# Patient Record
Sex: MALE | Race: WHITE | URBAN - METROPOLITAN AREA
[De-identification: names, ages, dates, MRNs, and addresses within clinical notes are randomized per-mention and may not be internally consistent; named-entity substitution may affect disease eponyms.]

---

## 2017-04-13 ENCOUNTER — EMERGENCY (EMERGENCY)
Facility: HOSPITAL | Age: 42
LOS: 0 days | Discharge: HOME | End: 2017-04-13
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
## 2017-06-27 DIAGNOSIS — Z87.891 PERSONAL HISTORY OF NICOTINE DEPENDENCE: ICD-10-CM

## 2017-06-27 DIAGNOSIS — S51.812A LACERATION WITHOUT FOREIGN BODY OF LEFT FOREARM, INITIAL ENCOUNTER: ICD-10-CM
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
## 2017-06-27 DIAGNOSIS — Z23 ENCOUNTER FOR IMMUNIZATION: ICD-10-CM
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
## 2020-04-26 ENCOUNTER — MESSAGE (OUTPATIENT)
Age: 45
End: 2020-04-26

## 2020-06-19 ENCOUNTER — EMERGENCY (EMERGENCY)
Facility: HOSPITAL | Age: 45
LOS: 0 days | Discharge: HOME | End: 2020-06-20
Attending: EMERGENCY MEDICINE | Admitting: EMERGENCY MEDICINE
Payer: COMMERCIAL

## 2020-06-19 VITALS
HEART RATE: 85 BPM | DIASTOLIC BLOOD PRESSURE: 69 MMHG | TEMPERATURE: 98 F | SYSTOLIC BLOOD PRESSURE: 114 MMHG | OXYGEN SATURATION: 97 % | RESPIRATION RATE: 18 BRPM | WEIGHT: 190.04 LBS

## 2020-06-19 DIAGNOSIS — Z79.899 OTHER LONG TERM (CURRENT) DRUG THERAPY: ICD-10-CM

## 2020-06-19 DIAGNOSIS — R55 SYNCOPE AND COLLAPSE: ICD-10-CM

## 2020-06-19 DIAGNOSIS — R91.1 SOLITARY PULMONARY NODULE: ICD-10-CM

## 2020-06-19 DIAGNOSIS — Z96.642 PRESENCE OF LEFT ARTIFICIAL HIP JOINT: ICD-10-CM

## 2020-06-19 DIAGNOSIS — Z96.649 PRESENCE OF UNSPECIFIED ARTIFICIAL HIP JOINT: Chronic | ICD-10-CM

## 2020-06-19 PROCEDURE — 99285 EMERGENCY DEPT VISIT HI MDM: CPT

## 2020-06-19 NOTE — ED ADULT NURSE NOTE - NSIMPLEMENTINTERV_GEN_ALL_ED
Implemented All Universal Safety Interventions:  [place] to call system. Call bell, personal items and telephone within reach. Instruct patient to call for assistance. Room bathroom lighting operational. Non-slip footwear when patient is off stretcher. Physically safe environment: no spills, clutter or unnecessary equipment. Stretcher in lowest position, wheels locked, appropriate side rails in place.

## 2020-06-19 NOTE — ED ADULT NURSE NOTE - CHIEF COMPLAINT QUOTE
pt is s/p left hip replacement last week, 2 syncopal episodes since, including tonight.  wife states patient was initially unarousable

## 2020-06-20 LAB
ALBUMIN SERPL ELPH-MCNC: 4 G/DL — SIGNIFICANT CHANGE UP (ref 3.5–5.2)
ALP SERPL-CCNC: 79 U/L — SIGNIFICANT CHANGE UP (ref 30–115)
ALT FLD-CCNC: 105 U/L — HIGH (ref 0–41)
ANION GAP SERPL CALC-SCNC: 11 MMOL/L — SIGNIFICANT CHANGE UP (ref 7–14)
AST SERPL-CCNC: 58 U/L — HIGH (ref 0–41)
BILIRUB SERPL-MCNC: 0.7 MG/DL — SIGNIFICANT CHANGE UP (ref 0.2–1.2)
BUN SERPL-MCNC: 19 MG/DL — SIGNIFICANT CHANGE UP (ref 10–20)
CALCIUM SERPL-MCNC: 9.1 MG/DL — SIGNIFICANT CHANGE UP (ref 8.5–10.1)
CHLORIDE SERPL-SCNC: 98 MMOL/L — SIGNIFICANT CHANGE UP (ref 98–110)
CO2 SERPL-SCNC: 27 MMOL/L — SIGNIFICANT CHANGE UP (ref 17–32)
CREAT SERPL-MCNC: 1 MG/DL — SIGNIFICANT CHANGE UP (ref 0.7–1.5)
GLUCOSE SERPL-MCNC: 123 MG/DL — HIGH (ref 70–99)
HCT VFR BLD CALC: 36.1 % — LOW (ref 42–52)
HGB BLD-MCNC: 12.1 G/DL — LOW (ref 14–18)
MCHC RBC-ENTMCNC: 30.5 PG — SIGNIFICANT CHANGE UP (ref 27–31)
MCHC RBC-ENTMCNC: 33.5 G/DL — SIGNIFICANT CHANGE UP (ref 32–37)
MCV RBC AUTO: 90.9 FL — SIGNIFICANT CHANGE UP (ref 80–94)
NRBC # BLD: 0 /100 WBCS — SIGNIFICANT CHANGE UP (ref 0–0)
NT-PROBNP SERPL-SCNC: 7 PG/ML — SIGNIFICANT CHANGE UP (ref 0–300)
PLATELET # BLD AUTO: 352 K/UL — SIGNIFICANT CHANGE UP (ref 130–400)
POTASSIUM SERPL-MCNC: 4.5 MMOL/L — SIGNIFICANT CHANGE UP (ref 3.5–5)
POTASSIUM SERPL-SCNC: 4.5 MMOL/L — SIGNIFICANT CHANGE UP (ref 3.5–5)
PROT SERPL-MCNC: 6.4 G/DL — SIGNIFICANT CHANGE UP (ref 6–8)
RBC # BLD: 3.97 M/UL — LOW (ref 4.7–6.1)
RBC # FLD: 12 % — SIGNIFICANT CHANGE UP (ref 11.5–14.5)
SODIUM SERPL-SCNC: 136 MMOL/L — SIGNIFICANT CHANGE UP (ref 135–146)
TROPONIN T SERPL-MCNC: <0.01 NG/ML — SIGNIFICANT CHANGE UP
WBC # BLD: 10.44 K/UL — SIGNIFICANT CHANGE UP (ref 4.8–10.8)
WBC # FLD AUTO: 10.44 K/UL — SIGNIFICANT CHANGE UP (ref 4.8–10.8)

## 2020-06-20 PROCEDURE — 71275 CT ANGIOGRAPHY CHEST: CPT | Mod: 26

## 2020-06-20 PROCEDURE — 71045 X-RAY EXAM CHEST 1 VIEW: CPT | Mod: 26

## 2020-06-20 RX ORDER — SODIUM CHLORIDE 9 MG/ML
1000 INJECTION INTRAMUSCULAR; INTRAVENOUS; SUBCUTANEOUS ONCE
Refills: 0 | Status: COMPLETED | OUTPATIENT
Start: 2020-06-20 | End: 2020-06-20

## 2020-06-20 RX ADMIN — SODIUM CHLORIDE 1000 MILLILITER(S): 9 INJECTION INTRAMUSCULAR; INTRAVENOUS; SUBCUTANEOUS at 00:11

## 2020-06-20 NOTE — ED PROVIDER NOTE - OBJECTIVE STATEMENT
45 yo male hx of recent left hip replacement 2/2 advanced arthritis BIBA 2/2 syncope. as per patient, he was sitting and playing video game for couple hours. after he got up, he walked to kitchen and started feeling flush/warm and lightheaded. he grabbed on to the sink and he passed out. witness by daughter, no injury to head. wife reported patient passed out for 1-2 minutes. no seizure like activity noted. denies incontinence/tongue bitting/HA/nausea and vomiting after the episode. denies HA/chest pain/abd pain prior to the episode. Denies HA/dizziness/chest pain/sob/abd pain/n/v/d in ed now.   c/o mild left thigh side pain/swelling which improved since the surgery.

## 2020-06-20 NOTE — ED PROVIDER NOTE - CARE PROVIDER_API CALL
BROCK HUMPHREY  Cardiovascular Disease  [address] [place] [id number]  Phone: [phone]  Fax: [phone]  Follow Up Time:     your primary care provider,   Phone: (   )    -  Fax: (   )    -  Follow Up Time:

## 2020-06-20 NOTE — ED PROVIDER NOTE - PROVIDER TOKENS
PROVIDER:[TOKEN:[[id number]]],FREE:[LAST:[your primary care provider],PHONE:[(   )    -],FAX:[(   )    -]]

## 2020-06-20 NOTE — ED PROVIDER NOTE - CLINICAL SUMMARY MEDICAL DECISION MAKING FREE TEXT BOX
44M p/w a syncopal episode prior to arrival. recent left hip replacement. Vitals reviewed to be wnl. pe- left hip surg. clean dry in tact.  scar with mild swelling noted in the left lle from the knee up. distal pulses present. concern for pe- ctpe neg. lab reviewed by me- wnl. likely vasovagal or from pain. dc home with pmd fu with strict return precautions given.

## 2020-06-20 NOTE — ED PROVIDER NOTE - NSFOLLOWUPINSTRUCTIONS_ED_ALL_ED_FT
You were noted to have what is called, a syncopal episode, which is an event when you temporarily lose consciousness. These events happen for a variety of reasons and you were kept in the hospital to evaluate what the cause of your event was. Thankfully, these events in themselves do not leave any long lasting effects on your body, however, they may happen again if the cause of the problem is not found and treated if need be. Many people never find out what caused their event. If you have been advised to follow up with any doctors, or specialists, please be sure to do so. You were noted to have what is called, a syncopal episode, which is an event when you temporarily lose consciousness. These events happen for a variety of reasons and you were kept in the hospital to evaluate what the cause of your event was. Thankfully, these events in themselves do not leave any long lasting effects on your body, however, they may happen again if the cause of the problem is not found and treated if need be. Many people never find out what caused their event. If you have been advised to follow up with any doctors, or specialists, please be sure to do so.    Syncope    Syncope is when you temporarily lose consciousness, also called fainting or passing out. It is caused by a sudden decrease in blood flow to the brain. Even though most causes of syncope are not dangerous, syncope can be a sign of a serious medical problem. Signs that you may be about to faint include feeling dizzy, lightheaded, nauseas, visual changes, cold/clammy skin. Do not drive, use machinery, or play sports until your health care provider says it is okay.    SEEK IMMEDIATE MEDICAL CARE IF YOU HAVE THE FOLLOWING SYMPTOMS: severe headache, pain in your chest/abdomen/back, bleeding from your mouth or rectum, palpitations, shortness of breath, pain with breathing, seizure, confusion, trouble walking.     Incidental Abnormal Radiological Finding    An incidental abnormal radiological finding (IARF) is an unusual mass or tissue change that is found unexpectedly during an imaging test. IARFs are often found in the kidneys or lungs, but they can also be found in the heart, liver, breasts, brain, gallbladder, uterus, or other organs and tissues.    IARFs can cause symptoms or be related to an undiagnosed illness. Most often, however, they do not cause symptoms and are not a cause for concern.     WHAT ARE COMMON TYPES OF IARFs?  There are many types of IARFs. Common types include:    Lesions. A lesion is a change in tissue due to infection, tissue death, or injury.  Cysts. A cyst is a sac that is filled with fluid, crystals, or some other substance.  Tumors. A tumor is a solid formation. Tumors can be cancerous (malignant) or non-cancerous (benign).    Your health care provider may use medical terms to describe the finding, such as "pulmonary nodule," which means a small mass in the lung, or "renal mass," which means a mass in the kidney. Ask your health care provider about any terms that you do not understand.    DO I NEED FURTHER DIAGNOSIS?  Your health care provider may recommend that you have tests to diagnose the cause of the IARF. Testing is recommended based on:    The size and appearance of the IARF.  Whether you have risk factors or medical conditions that increase your risk of problems.  Whether you have symptoms or concerns.    In many cases, testing is not needed if the IARF is a very small mass or tissue change. Small masses or changes are not often likely to become a problem in the future.    WHAT TYPE OF TESTING MAY BE NEEDED?  The following types of tests may be done when an IARF is found:    Blood tests.  Urine tests.  Imaging tests, such as abdominal ultrasound, CT scan, or MRI.  Biopsy.    Tests and physical exams may be done once, or they may be done regularly for a period of time. Tests and exams that are done regularly are performed to show whether the mass or tissue change is growing and becoming a concern.    WHAT ARE COMMON TREATMENTS?  Treatment varies depending on:    The cause of the IARF.  The location, size, and appearance of the IARF.  Your age.  Any underlying conditions or symptoms.    Treatment is not always needed. Your health care provider may recommend monitoring through watchful waiting and regular tests and exams. If treatment is needed, it may include:    Treatments to reduce the size of the abnormality.  Biopsy or surgical removal of the mass or tissue.  Treatment to address any underlying conditions.    HOME CARE INSTRUCTIONS  Keep all follow-up visits as told by your health care provider, and schedule appointments as directed. This is important. It will allow any problems to be detected early, which can be very beneficial to you.  Try to stay calm, and be sure to ask questions. Make sure you understand the recommendations for monitoring and understand whether there is a reason for concern.    ADDITIONAL NOTES AND INSTRUCTIONS    Please follow up with your Primary MD in 24-48 hr.  Seek immediate medical care for any new/worsening signs or symptoms.

## 2020-06-20 NOTE — ED PROVIDER NOTE - PROGRESS NOTE DETAILS
incidental enlarge lymph node and lung nodule noted and result disclosed to patient. aware he will need to have repeat CT and close f/u within next few months

## 2020-06-20 NOTE — ED PROVIDER NOTE - PHYSICAL EXAMINATION
CONSTITUTIONAL: Well-appearing; well-nourished; in no apparent distress.   EYES: PERRL; EOM intact.   CARDIOVASCULAR: Normal S1, S2; no murmurs, rubs, or gallops.   RESPIRATORY: Normal chest excursion with respiration; breath sounds clear and equal bilaterally; no wheezes, rhonchi, or rales.  GI/: Normal bowel sounds; non-distended; non-tender; no palpable organomegaly.   MS: left thigh/lower leg mildly larger the than right. no calf tenderness. no medial thigh tenderness. + mild ttp to the lateral aspect of left thigt along the iliotibial tract.   SKIN: Normal for age and race; warm; dry; good turgor; no apparent lesions or exudate.   NEURO/PSYCH: A & O x 4; grossly unremarkable. no drifting. strength equal to b/l upper and lower extremities. speaking coherently.

## 2020-06-20 NOTE — ED PROVIDER NOTE - ADDITIONAL RISK FACTOR FREE TEXT BOX
44M p/w a sycopal episode prior to arricval. recent left hip replacement. Vitals reviewed to be wnl. pe- left hip surg. clean dry in tact.  scar with mild swelling noted in the left lle from the knee up. distal pulses present. concern for pe- pending ctpe

## 2020-06-20 NOTE — ED PROVIDER NOTE - PATIENT PORTAL LINK FT
You can access the FollowMyHealth Patient Portal offered by [facility] by registering at the following website: [email or web address]. By joining [employer]’s FollowMyHealth portal, you will also be able to view your health information using other applications (apps) compatible with our system.

## 2022-03-15 NOTE — ED ADULT NURSE NOTE - NSHOSCREENINGQ1_ED_ALL_ED
- Patient PW k 5.6 in ED in the setting of JAMES  , given lokelma   - repeat K 5.7 , was given regular insulin and lokelma   - K normal today  - resolved  - continue monitoring No

## 2022-04-29 ENCOUNTER — EMERGENCY (EMERGENCY)
Facility: HOSPITAL | Age: 47
LOS: 0 days | Discharge: HOME | End: 2022-04-29
Attending: EMERGENCY MEDICINE | Admitting: EMERGENCY MEDICINE
Payer: COMMERCIAL

## 2022-04-29 VITALS
OXYGEN SATURATION: 98 % | HEART RATE: 88 BPM | WEIGHT: 184.97 LBS | SYSTOLIC BLOOD PRESSURE: 141 MMHG | TEMPERATURE: 97 F | DIASTOLIC BLOOD PRESSURE: 76 MMHG | RESPIRATION RATE: 18 BRPM

## 2022-04-29 DIAGNOSIS — S50.312A ABRASION OF LEFT ELBOW, INITIAL ENCOUNTER: ICD-10-CM

## 2022-04-29 DIAGNOSIS — Z79.82 LONG TERM (CURRENT) USE OF ASPIRIN: ICD-10-CM

## 2022-04-29 DIAGNOSIS — S80.211A ABRASION, RIGHT KNEE, INITIAL ENCOUNTER: ICD-10-CM

## 2022-04-29 DIAGNOSIS — S80.212A ABRASION, LEFT KNEE, INITIAL ENCOUNTER: ICD-10-CM

## 2022-04-29 DIAGNOSIS — M79.10 MYALGIA, UNSPECIFIED SITE: ICD-10-CM
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
## 2022-04-29 DIAGNOSIS — S09.90XA UNSPECIFIED INJURY OF HEAD, INITIAL ENCOUNTER: ICD-10-CM

## 2022-04-29 DIAGNOSIS — Z96.649 PRESENCE OF UNSPECIFIED ARTIFICIAL HIP JOINT: Chronic | ICD-10-CM

## 2022-04-29 PROCEDURE — 73130 X-RAY EXAM OF HAND: CPT | Mod: 26,50

## 2022-04-29 PROCEDURE — 73070 X-RAY EXAM OF ELBOW: CPT | Mod: 26,LT

## 2022-04-29 PROCEDURE — 99283 EMERGENCY DEPT VISIT LOW MDM: CPT

## 2022-04-29 RX ORDER — CEPHALEXIN 500 MG
1 CAPSULE ORAL
Qty: 28 | Refills: 0
Start: 2022-04-29 | End: 2022-05-05

## 2022-04-29 RX ORDER — IBUPROFEN 200 MG
800 TABLET ORAL ONCE
Refills: 0 | Status: COMPLETED | OUTPATIENT
Start: 2022-04-29 | End: 2022-04-29

## 2022-04-29 RX ORDER — CEPHALEXIN 500 MG
500 CAPSULE ORAL ONCE
Refills: 0 | Status: COMPLETED | OUTPATIENT
Start: 2022-04-29 | End: 2022-04-29

## 2022-04-29 RX ADMIN — Medication 800 MILLIGRAM(S): at 22:51

## 2022-04-29 RX ADMIN — Medication 500 MILLIGRAM(S): at 22:51

## 2022-04-29 NOTE — ED PROVIDER NOTE - MUSCULOSKELETAL, MLM
swelling and tenderness to rigth 3rd finger and  left hand, FROM, Minor abrasions, FROM, NV intact, tenderness left elbow with abrasions, Minor abrasion to both knees  FROM<
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
## 2022-04-29 NOTE — ED PROVIDER NOTE - PATIENT PORTAL LINK FT
You can access the FollowMyHealth Patient Portal offered by [facility] by registering at the following website: [email or web address]. By joining [employer]’s FollowMyHealth portal, you will also be able to view your health information using other applications (apps) compatible with our system.

## 2022-04-29 NOTE — ED PROVIDER NOTE - CROS ED EYES ALL NEG
SUBJECTIVE:   CC: Pallavi Harper is an 46 year old male who presents for preventative health visit.     Patient has been advised of split billing requirements and indicates understanding: Yes  Healthy Habits:     Getting at least 3 servings of Calcium per day:  Yes    Bi-annual eye exam:  Yes    Dental care twice a year:  NO    Sleep apnea or symptoms of sleep apnea:  Sleep apnea    Diet:  Carbohydrate counting, Vegetarian/vegan, Gluten-free/reduced and Breakfast skipped    Frequency of exercise:  2-3 days/week    Duration of exercise:  15-30 minutes    Taking medications regularly:  Yes    Barriers to taking medications:  None    Medication side effects:  None    PHQ-2 Total Score: 2    Additional concerns today:  Yes          Patient overall feels healthy however he sometimes thinks he has something wrong with him he has mild to moderate anxiety mostly due to current situation with pandemic otherwise no active complaints or symptoms.    Today's PHQ-2 Score:   PHQ-2 (  [employer]) 2021   Q1: Little interest or pleasure in doing things 1   Q2: Feeling down, depressed or hopeless 1   PHQ-2 Score 2   Q1: Little interest or pleasure in doing things Several days   Q2: Feeling down, depressed or hopeless Several days   PHQ-2 Score 2       Abuse: Current or Past(Physical, Sexual or Emotional)- No  Do you feel safe in your environment? Yes    Have you ever done Advance Care Planning? (For example, a Health Directive, POLST, or a discussion with a medical provider or your loved ones about your wishes):   Social History     Tobacco Use     Smoking status: Former Smoker     Packs/day: 0.25     Types: Cigarettes     Quit date: 2/3/2009     Years since quittin.5     Smokeless tobacco: Never Used   Substance Use Topics     Alcohol use: No     If you drink alcohol do you typically have >3 drinks per day or >7 drinks per week? No      No flowsheet data found.    Last PSA: No results found for: PSA    Reviewed orders  "with patient. Reviewed health maintenance and updated orders accordingly - Yes  Lab work is in process    Reviewed and updated as needed this visit by clinical staff  Tobacco  Allergies  Meds              Reviewed and updated as needed this visit by Provider                    Review of Systems  CONSTITUTIONAL: NEGATIVE for fever, chills, change in weight  INTEGUMENTARY/SKIN: NEGATIVE for worrisome rashes, moles or lesions  EYES: NEGATIVE for vision changes or irritation  ENT: NEGATIVE for ear, mouth and throat problems  RESP: NEGATIVE for significant cough or SOB  CV: NEGATIVE for chest pain, palpitations or peripheral edema  GI: NEGATIVE for nausea, abdominal pain, heartburn, or change in bowel habits   male: negative for dysuria, hematuria, decreased urinary stream, erectile dysfunction, urethral discharge  MUSCULOSKELETAL: NEGATIVE for significant arthralgias or myalgia  NEURO: NEGATIVE for weakness, dizziness or paresthesias  PSYCHIATRIC: NEGATIVE for changes in mood or affect    OBJECTIVE:   /78   Pulse 85   Temp 97.7  F (36.5  C) (Tympanic)   Ht 1.64 m (5' 4.57\")   Wt 89.4 kg (197 lb)   SpO2 97%   BMI 33.22 kg/m      Physical Exam  GENERAL: healthy, alert and no distress  EYES: Eyes grossly normal to inspection, PERRL and conjunctivae and sclerae normal  HENT: ear canals and TM's normal, nose and mouth without ulcers or lesions  NECK: no adenopathy, no asymmetry, masses, or scars and thyroid normal to palpation  RESP: lungs clear to auscultation - no rales, rhonchi or wheezes  CV: regular rate and rhythm, normal S1 S2, no S3 or S4, no murmur, click or rub, no peripheral edema and peripheral pulses strong  ABDOMEN: soft, nontender, no hepatosplenomegaly, no masses and bowel sounds normal  MS: no gross musculoskeletal defects noted, no edema  SKIN: no suspicious lesions or rashes  NEURO: Normal strength and tone, mentation intact and speech normal  PSYCH: mentation appears normal, affect " "normal/bright    Diagnostic Test Results:  Labs reviewed in Epic    ASSESSMENT/PLAN:       ICD-10-CM    1. Routine general medical examination at a health care facility  Z00.00 HIV Antigen Antibody Combo     Hepatitis C Screen Reflex to HCV RNA Quant and Genotype     Lipid panel reflex to direct LDL Fasting     Comprehensive metabolic panel (BMP + Alb, Alk Phos, ALT, AST, Total. Bili, TP)     PSA, screen   2. Screening for HIV (human immunodeficiency virus)  Z11.4 HIV Antigen Antibody Combo   3. Need for hepatitis C screening test  Z11.59 Hepatitis C Screen Reflex to HCV RNA Quant and Genotype   4. CARDIOVASCULAR SCREENING; LDL GOAL LESS THAN 160  Z13.6 Lipid panel reflex to direct LDL Fasting     Comprehensive metabolic panel (BMP + Alb, Alk Phos, ALT, AST, Total. Bili, TP)   5. Screening for prostate cancer  Z12.5 PSA, screen   6. BOUCHRA (obstructive sleep apnea)  G47.33      Discussed with the patient about healthy lifestyle.  We will get some blood work and follow-up on that.  He is slight concern about diabetes however he has been checking his blood sugar at home and are never above 100.  Discussed with him about doing daily exercise 30 minutes a day that may be helpful losing some weight and eating healthy food.  He is motivated and will follow up accordingly.  Patient has been advised of split billing requirements and indicates understanding: Yes  COUNSELING:   Reviewed preventive health counseling, as reflected in patient instructions       Regular exercise       Healthy diet/nutrition    Estimated body mass index is 33.22 kg/m  as calculated from the following:    Height as of this encounter: 1.64 m (5' 4.57\").    Weight as of this encounter: 89.4 kg (197 lb).         He reports that he quit smoking about 12 years ago. His smoking use included cigarettes. He smoked 0.25 packs per day. He has never used smokeless tobacco.      Counseling Resources:  ATP IV Guidelines  Pooled Cohorts Equation Calculator  FRAX " Risk Assessment  ICSI Preventive Guidelines  Dietary Guidelines for Americans, 2010  [employer]'s MyPlate  ASA Prophylaxis  Lung CA Screening    Kevan Kay MD  [facility] [place]  Answers for HPI/ROS submitted by the patient on 8/24/2021  Blood in stool: No  heartburn: No  peripheral edema: No  mood changes: No  Skin sensation changes: No  pelvic pain: Yes  impotence: Yes  penile discharge: No       negative...

## 2022-04-29 NOTE — ED ADULT NURSE NOTE - NSIMPLEMENTINTERV_GEN_ALL_ED
Implemented All Fall Risk Interventions:  [place] to call system. Call bell, personal items and telephone within reach. Instruct patient to call for assistance. Room bathroom lighting operational. Non-slip footwear when patient is off stretcher. Physically safe environment: no spills, clutter or unnecessary equipment. Stretcher in lowest position, wheels locked, appropriate side rails in place. Provide visual cue, wrist band, yellow gown, etc. Monitor gait and stability. Monitor for mental status changes and reorient to person, place, and time. Review medications for side effects contributing to fall risk. Reinforce activity limits and safety measures with patient and family.
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
## 2022-04-29 NOTE — ED PROVIDER NOTE - ENMT, MLM
Airway patent, Nasal mucosa clear. Mouth with normal mucosa. Throat has no vesicles, no oropharyngeal exudates and uvula is midline., minor abrasion to left mandible, Bite normal,

## 2022-04-30 PROBLEM — Z78.9 OTHER SPECIFIED HEALTH STATUS: Chronic | Status: ACTIVE | Noted: 2020-06-20

## 2023-04-25 ENCOUNTER — INPATIENT (INPATIENT)
Facility: HOSPITAL | Age: 48
LOS: 1 days | Discharge: HOME | DRG: 384 | End: 2023-04-27
Attending: INTERNAL MEDICINE | Admitting: INTERNAL MEDICINE
Payer: OTHER MISCELLANEOUS

## 2023-04-25 VITALS
TEMPERATURE: 98 F | HEIGHT: 70 IN | WEIGHT: 184.97 LBS | HEART RATE: 88 BPM | SYSTOLIC BLOOD PRESSURE: 139 MMHG | OXYGEN SATURATION: 99 % | RESPIRATION RATE: 19 BRPM | DIASTOLIC BLOOD PRESSURE: 87 MMHG

## 2023-04-25 DIAGNOSIS — R29.6 REPEATED FALLS: ICD-10-CM

## 2023-04-25 DIAGNOSIS — Z96.649 PRESENCE OF UNSPECIFIED ARTIFICIAL HIP JOINT: Chronic | ICD-10-CM

## 2023-04-25 PROBLEM — Z00.00 ENCOUNTER FOR PREVENTIVE HEALTH EXAMINATION: Status: ACTIVE | Noted: 2023-04-25

## 2023-04-25 LAB
ANION GAP SERPL CALC-SCNC: 11 MMOL/L — SIGNIFICANT CHANGE UP (ref 7–14)
BUN SERPL-MCNC: 16 MG/DL — SIGNIFICANT CHANGE UP (ref 10–20)
CALCIUM SERPL-MCNC: 9.6 MG/DL — SIGNIFICANT CHANGE UP (ref 8.4–10.5)
CHLORIDE SERPL-SCNC: 104 MMOL/L — SIGNIFICANT CHANGE UP (ref 98–110)
CO2 SERPL-SCNC: 24 MMOL/L — SIGNIFICANT CHANGE UP (ref 17–32)
CREAT SERPL-MCNC: 1.1 MG/DL — SIGNIFICANT CHANGE UP (ref 0.7–1.5)
EGFR: 83 ML/MIN/1.73M2 — SIGNIFICANT CHANGE UP
GLUCOSE SERPL-MCNC: 94 MG/DL — SIGNIFICANT CHANGE UP (ref 70–99)
HCT VFR BLD CALC: 42.5 % — SIGNIFICANT CHANGE UP (ref 42–52)
HGB BLD-MCNC: 14.9 G/DL — SIGNIFICANT CHANGE UP (ref 14–18)
MCHC RBC-ENTMCNC: 30.3 PG — SIGNIFICANT CHANGE UP (ref 27–31)
MCHC RBC-ENTMCNC: 35.1 G/DL — SIGNIFICANT CHANGE UP (ref 32–37)
MCV RBC AUTO: 86.4 FL — SIGNIFICANT CHANGE UP (ref 80–94)
NRBC # BLD: 0 /100 WBCS — SIGNIFICANT CHANGE UP (ref 0–0)
PLATELET # BLD AUTO: 254 K/UL — SIGNIFICANT CHANGE UP (ref 130–400)
PMV BLD: 9.2 FL — SIGNIFICANT CHANGE UP (ref 7.4–10.4)
POTASSIUM SERPL-MCNC: 4.3 MMOL/L — SIGNIFICANT CHANGE UP (ref 3.5–5)
POTASSIUM SERPL-SCNC: 4.3 MMOL/L — SIGNIFICANT CHANGE UP (ref 3.5–5)
RBC # BLD: 4.92 M/UL — SIGNIFICANT CHANGE UP (ref 4.7–6.1)
RBC # FLD: 12 % — SIGNIFICANT CHANGE UP (ref 11.5–14.5)
SODIUM SERPL-SCNC: 139 MMOL/L — SIGNIFICANT CHANGE UP (ref 135–146)
WBC # BLD: 6.92 K/UL — SIGNIFICANT CHANGE UP (ref 4.8–10.8)
WBC # FLD AUTO: 6.92 K/UL — SIGNIFICANT CHANGE UP (ref 4.8–10.8)

## 2023-04-25 PROCEDURE — 86901 BLOOD TYPING SEROLOGIC RH(D): CPT

## 2023-04-25 PROCEDURE — 86850 RBC ANTIBODY SCREEN: CPT

## 2023-04-25 PROCEDURE — 36415 COLL VENOUS BLD VENIPUNCTURE: CPT

## 2023-04-25 PROCEDURE — 85610 PROTHROMBIN TIME: CPT

## 2023-04-25 PROCEDURE — 99285 EMERGENCY DEPT VISIT HI MDM: CPT

## 2023-04-25 PROCEDURE — 99221 1ST HOSP IP/OBS SF/LOW 40: CPT

## 2023-04-25 PROCEDURE — 73552 X-RAY EXAM OF FEMUR 2/>: CPT | Mod: LT

## 2023-04-25 PROCEDURE — 73502 X-RAY EXAM HIP UNI 2-3 VIEWS: CPT | Mod: 26,LT

## 2023-04-25 PROCEDURE — 97162 PT EVAL MOD COMPLEX 30 MIN: CPT | Mod: GP

## 2023-04-25 PROCEDURE — 80048 BASIC METABOLIC PNL TOTAL CA: CPT

## 2023-04-25 PROCEDURE — 99053 MED SERV 10PM-8AM 24 HR FAC: CPT

## 2023-04-25 PROCEDURE — 86900 BLOOD TYPING SEROLOGIC ABO: CPT

## 2023-04-25 PROCEDURE — 70450 CT HEAD/BRAIN W/O DYE: CPT | Mod: 26,MA

## 2023-04-25 PROCEDURE — 97165 OT EVAL LOW COMPLEX 30 MIN: CPT | Mod: GO

## 2023-04-25 PROCEDURE — 72192 CT PELVIS W/O DYE: CPT | Mod: 26,MA

## 2023-04-25 PROCEDURE — 83735 ASSAY OF MAGNESIUM: CPT

## 2023-04-25 PROCEDURE — 80053 COMPREHEN METABOLIC PANEL: CPT

## 2023-04-25 PROCEDURE — 85730 THROMBOPLASTIN TIME PARTIAL: CPT

## 2023-04-25 PROCEDURE — 73070 X-RAY EXAM OF ELBOW: CPT | Mod: 26,50

## 2023-04-25 PROCEDURE — 85025 COMPLETE CBC W/AUTO DIFF WBC: CPT

## 2023-04-25 PROCEDURE — 85027 COMPLETE CBC AUTOMATED: CPT

## 2023-04-25 PROCEDURE — 71045 X-RAY EXAM CHEST 1 VIEW: CPT | Mod: 26

## 2023-04-25 RX ORDER — KETOROLAC TROMETHAMINE 30 MG/ML
15 SYRINGE (ML) INJECTION ONCE
Refills: 0 | Status: DISCONTINUED | OUTPATIENT
Start: 2023-04-25 | End: 2023-04-25

## 2023-04-25 RX ORDER — MORPHINE SULFATE 50 MG/1
4 CAPSULE, EXTENDED RELEASE ORAL ONCE
Refills: 0 | Status: DISCONTINUED | OUTPATIENT
Start: 2023-04-26 | End: 2023-04-26

## 2023-04-25 RX ORDER — MORPHINE SULFATE 50 MG/1
4 CAPSULE, EXTENDED RELEASE ORAL AT BEDTIME
Refills: 0 | Status: DISCONTINUED | OUTPATIENT
Start: 2023-04-25 | End: 2023-04-26

## 2023-04-25 RX ORDER — MORPHINE SULFATE 50 MG/1
4 CAPSULE, EXTENDED RELEASE ORAL ONCE
Refills: 0 | Status: DISCONTINUED | OUTPATIENT
Start: 2023-04-25 | End: 2023-04-25

## 2023-04-25 RX ORDER — ENOXAPARIN SODIUM 100 MG/ML
40 INJECTION SUBCUTANEOUS EVERY 24 HOURS
Refills: 0 | Status: DISCONTINUED | OUTPATIENT
Start: 2023-04-25 | End: 2023-04-27

## 2023-04-25 RX ADMIN — MORPHINE SULFATE 4 MILLIGRAM(S): 50 CAPSULE, EXTENDED RELEASE ORAL at 11:44

## 2023-04-25 RX ADMIN — MORPHINE SULFATE 4 MILLIGRAM(S): 50 CAPSULE, EXTENDED RELEASE ORAL at 22:30

## 2023-04-25 RX ADMIN — Medication 15 MILLIGRAM(S): at 06:51

## 2023-04-25 NOTE — ED PROVIDER NOTE - CARE PLAN
Assessment and plan of treatment:	fall  imaging, supportive care   1 Principal Discharge DX:	Fall  Assessment and plan of treatment:	fall  imaging, supportive care  Secondary Diagnosis:	Left hip pain  Secondary Diagnosis:	Contusion   Principal Discharge DX:	Fall  Assessment and plan of treatment:	fall  imaging, supportive care  Secondary Diagnosis:	Left hip pain  Secondary Diagnosis:	Contusion  Secondary Diagnosis:	Ambulatory dysfunction

## 2023-04-25 NOTE — ED PROVIDER NOTE - CARE PROVIDER_API CALL
Benigno Olvera (MD)  Orthopaedic Surgery  [address] [place] [address]  Phone: [phone]  Fax: [phone]  Follow Up Time: 1-3 Days

## 2023-04-25 NOTE — ED ADULT NURSE NOTE - NS ED NURSE LEVEL OF CONSCIOUSNESS MENTAL STATUS
DISCHARGE SUMMARY  This is a  male born on 2022 at a gestational age of Gestational Age: 36w4d.     Infant remains hospitalized for:   Information:           Birth Length: 1' 7.5\" (0.495 m)   Birth Head Circumference: 31 cm (12.21\")   Discharge Weight - Scale: 6 lb 11 oz (3.033 kg)  Percent Weight Change Since Birth: 0.44%   Delivery Method: Vaginal, Spontaneous  APGAR One: 9  APGAR Five: 9  APGAR Ten: N/A              Feeding Method Used: Breastfeeding    Recent Labs:   Admission on 2021   Component Date Value Ref Range Status    Sample Type 2021 Cord-Arterial   Final    POC pH 20213   Final    POC pCO2 2021  mmHg Final    POC PO2 2021  mmHg Final    POC HCO3 2021  mmol/L Final    POC Base Excess 2021 -3.6  mmol/L Final    POC O2 SAT 2021  % Final    POC CPB 2021 No   Final    POC  ID 2021 [id number]   Final    POC Device ID 2021 [id number]   Final    Sample Type 2021 Cord-Venous   Final    POC pH 20214   Final    POC pCO2 2021  mmHg Final    POC PO2 2021  mmHg Final    POC HCO3 2021  mmol/L Final    POC Base Excess 2021 -4.5  mmol/L Final    POC O2 SAT 2021  % Final    POC CPB 2021 No   Final    POC  ID 2021 [id number]   Final    POC Device ID 2021 [id number]   Final    Amphetamine Screen, Urine 2021 NOT DETECTED  Negative <1000 ng/mL Final    Barbiturate Screen, Ur 2021 NOT DETECTED  Negative < 200 ng/mL Final    Benzodiazepine Screen, Urine 2021 NOT DETECTED  Negative < 200 ng/mL Final    Cannabinoid Scrn, Ur 2021 NOT DETECTED  Negative < 50ng/mL Final    Cocaine Metabolite Screen, Urine 2021 NOT DETECTED  Negative < 300 ng/mL Final    Opiate Scrn, Ur 2021 NOT DETECTED  Negative < 300ng/mL Final    PCP Screen, Urine 2021 NOT DETECTED  Negative < 25 ng/mL Final    Methadone Screen, Urine 2021 NOT DETECTED  Negative <300 ng/mL Final    Oxycodone Urine 2021 NOT DETECTED  Negative <100 ng/mL Final    FENTANYL SCREEN, URINE 2021 NOT DETECTED  Negative <1 ng/mL Final    Drug Screen Comment: 2021 see below   Final    Meter Glucose 2021 63* 70 - 110 mg/dL Final      Immunization History   Administered Date(s) Administered    Hepatitis B Ped/Adol (Engerix-B, Recombivax HB) 2021       Maternal Labs: Information for the patient's mother:  Ortega Navarrete [[id number]]   No results found for: RPR, RUBELLAIGGQT, HEPBSAG, HIV1X2        Information for the patient's mother:  Oretga Navarrete [[id number]]   B POS   No results for input(s): [address]  in the last 72 hours. TcBili: Transcutaneous Bilirubin Test  Time Taken: 0500  Transcutaneous Bilirubin Result: 6.7  Hearing Screen Result: Screening 1 Results: Right Ear Refer,Left Ear Refer      Oximeter: @LASTSAO2(3)@   CCHD: O2 sat of right hand Pulse Ox Saturation of Right Hand: 100 %  CCHD: O2 sat of foot : Pulse Ox Saturation of Foot: 100 %  CCHD screening result: Screening  Result: Pass    DISCHARGE EXAMINATION:   Vital Signs:  BP 69/36   Pulse 120   Temp 98.2 °F (36.8 °C)   Resp 44   Ht 19.5\" (49.5 cm) Comment: Filed from Delivery Summary  Wt 6 lb 11 oz (3.033 kg)   HC 31 cm (12.21\") Comment: Filed from Delivery Summary  BMI 12.37 kg/m²       General Appearance:  Healthy-appearing, vigorous infant, strong cry.   Skin: warm, dry, normal color, no rashes                             Head:  Sutures mobile, fontanelles normal size  Eyes:  Sclerae white, pupils equal and reactive, red reflex normal  bilaterally                                    Ears:  Well-positioned, well-formed pinnae                         Nose:  Clear, normal mucosa  Throat:  Lips, tongue and mucosa are pink, moist and intact; palate intact  Neck:  Supple, symmetrical  Chest:  Lungs clear to auscultation, respirations unlabored   Heart:  Regular rate & rhythm, S1 S2, no murmurs, rubs, or gallops  Abdomen:  Soft, non-tender, no masses; umbilical stump clean and dry  Umbilicus:   3 vessel cord  Pulses:  Strong equal femoral pulses, brisk capillary refill  Hips:  Negative Peterson, Ortolani, gluteal creases equal  :  Normal genitalia; non-circumcised  Extremities:  Well-perfused, warm and dry  Neuro:  Easily aroused; good symmetric tone and strength; positive root and suck; symmetric normal reflexes                                       Assessment:  male infant born at a gestational age of Gestational Age: 36w4d. Gestational Age: appropriate for gestational age  Gestation: full term  M:   Delivery Route: Delivery Method: Vaginal, Spontaneous   Patient Active Problem List   Diagnosis    Normal  (single liveborn)     Principal diagnosis: <principal problem not specified>     Plan: 1. Discharge home in stable condition with parent(s)/ legal guardian  2. Follow up with PCP: No primary care provider on file. in 1-2 days. Call for appointment. 3. Discharge instructions reviewed with family.         Electronically signed by Billie Zendejas MD on 2021 at 11:36 AM Awake

## 2023-04-25 NOTE — ED PROVIDER NOTE - NSFOLLOWUPINSTRUCTIONS_ED_ALL_ED_FT
Contusion    A contusion is a deep bruise. Contusions are the result of a blunt injury to tissues and muscle fibers under the skin. The injury causes bleeding under the skin. The skin overlying the contusion may turn blue, purple, or yellow. Minor injuries will give you a painless contusion, but more severe contusions may stay painful and swollen for a few weeks.     CAUSES  This condition is usually caused by a blow, trauma, or direct force to an area of the body.    SYMPTOMS  Symptoms of this condition include:    Swelling of the injured area.  Pain and tenderness in the injured area.  Discoloration. The area may have redness and then turn blue, purple, or yellow.    DIAGNOSIS  This condition is diagnosed based on a physical exam and medical history. An X-ray, CT scan, or MRI may be needed to determine if there are any associated injuries, such as broken bones (fractures).    TREATMENT  Specific treatment for this condition depends on what area of the body was injured. In general, the best treatment for a contusion is resting, icing, applying pressure to (compression), and elevating the injured area. This is often called the RICE strategy. Over-the-counter anti-inflammatory medicines may also be recommended for pain control.     HOME CARE INSTRUCTIONS  Rest the injured area.   If directed, apply ice to the injured area:  Put ice in a plastic bag.  Place a towel between your skin and the bag.  Leave the ice on for 20 minutes, 2–3 times per day.  If directed, apply light compression to the injured area using an elastic bandage. Make sure the bandage is not wrapped too tightly. Remove and reapply the bandage as directed by your health care provider.  If possible, raise (elevate) the injured area above the level of your heart while you are sitting or lying down.   Take over-the-counter and prescription medicines only as told by your health care provider.    SEEK MEDICAL CARE IF:  Your symptoms do not improve after several days of treatment.  Your symptoms get worse.   You have difficulty moving the injured area.    SEEK IMMEDIATE MEDICAL CARE IF:  You have severe pain.  You have numbness in a hand or foot.   Your hand or foot turns pale or cold.  ADDITIONAL NOTES AND INSTRUCTIONS    Please follow up with your Primary MD in 24-48 hr.  Seek immediate medical care for any new/worsening signs or symptoms.

## 2023-04-25 NOTE — H&P ADULT - ASSESSMENT
48 yo PMHx of left hip replacement, not on home medicaions c/o pain to b/l elbows, left hip and head after fall at work off 2 foot step onto low back, then hit head. no loc. HD stable, afebrile on arrival to ED with negative trauma workup. Patient was originally on track to be discharged home but found unable to ambulate in ED on attempted  walk with crutches, is able to negotiate only a few steps, Lives alone,  has stairs at home, still complains of a lot of pain, will admit for inability to ambulate and unsafe discharge home at present.    #Inability to ambulate secondary to intractable pain, s/p   #mechanical fall  #Hx L- hip replacement  - CT head/ pelvis on admission without acute fx, CXR unremarkable,    b.l  X-ray elbow without trauma  PLAN:  - c/w morphine PO PRN sever pain  - PT, OT in am  - ambulate with assistance only  - consider MRI pelvis in am if pain unimproving    #Diet: Regular  #DVT Prophylaxis: Lovenox SubQ  #Code Satus: full code

## 2023-04-25 NOTE — ED ADULT NURSE NOTE - CAS DISCH TRANSFER METHOD
Patient has concerns about side effects of bustirone. Patient having severe anxiety and illuminating thoughts( hallucinations).    Aislinn Beard on 2/25/2021 at 8:51 AM    Private car

## 2023-04-25 NOTE — ED PROVIDER NOTE - PHYSICAL EXAMINATION
VITAL SIGNS: I have reviewed nursing notes and confirm.  CONSTITUTIONAL: Well-developed; well-nourished; in no acute distress.  SKIN: Skin exam is warm and dry, no acute rash.  HEAD: Normocephalic; atraumatic.  EYES: PERRL, EOM intact; conjunctiva and sclera clear.  ENT: No nasal discharge; airway clear.   NECK: Supple; non tender.  CARD:+ S1, S2   RESP: No wheezes, rales or rhonchi.  ABD: Normal bowel sounds; soft; non-distended; non-tender;  EXT: Normal ROM. No cyanosis or edema. tender b/l elbows and left hip.  LYMPH: No acute adenopathy.  NEURO: Alert. Grossly unremarkable. No focal deficits.  PSYCH: Cooperative, appropriate.

## 2023-04-25 NOTE — ED ADULT NURSE NOTE - NSICDXPASTSURGICALHX_GEN_ALL_CORE_FT
PAST SURGICAL HISTORY:  History of hip replacement left hip 06/2020 [facility]

## 2023-04-25 NOTE — H&P ADULT - ATTENDING COMMENTS
HPI:  48 yo PMHx of left hip replacement, not on home medicaions c/o pain to b/l elbows, left hip and head after fall at work off 2 foot step onto low back, then hit head. no loc. HD stable, afebrile on arrival to ED with negative trauma workup. Patient was originally on track to be discharged home but found unable to ambulate in ED on attempted  walk with crutches, is able to negotiate only a few steps, Lives alone,  has stairs at home, still complains of a lot of pain, will admit for inability to ambulate and unsafe discharge home at present.   (25 Apr 2023 18:10)    REVIEW OF SYSTEMS: see cc/HPI   CONSTITUTIONAL: No weakness, fevers or chills  EYES/ENT: No visual changes;  No vertigo or throat pain   NECK: No pain or stiffness  RESPIRATORY: No cough, wheezing, hemoptysis; No shortness of breath  CARDIOVASCULAR: No chest pain or palpitations  GASTROINTESTINAL: No abdominal or epigastric pain. No nausea, vomiting, or hematemesis; No diarrhea or constipation. No melena or hematochezia.  GENITOURINARY: No dysuria, frequency or hematuria  NEUROLOGICAL: No numbness or weakness  SKIN: No itching, rashes    Physical Exam:  General: WN/WD NAD  Neurology: A&Ox3, nonfocal, follows commands  Eyes: PERRLA/ EOMI  ENT/Neck: Neck supple, trachea midline, No JVD  Respiratory: CTA B/L, No wheezing, rales, rhonchi  CV: Normal rate regular rhythm, S1S2, no murmurs, rubs or gallops  Abdominal: Soft, NT, ND +BS,   Extremities: No edema, + peripheral pulses  Skin: No Rashes, Hematoma, Ecchymosis  Incisions:   Tubes: HPI:  46 yo PMHx of left hip replacement, not on home medicaions c/o pain to b/l elbows, left hip and head after fall at work off 2 foot step onto low back, then hit head. no loc. HD stable, afebrile on arrival to ED with negative trauma workup. Patient was originally on track to be discharged home but found unable to ambulate in ED on attempted  walk with crutches, is able to negotiate only a few steps, Lives alone,  has stairs at home, still complains of a lot of pain, will admit for inability to ambulate and unsafe discharge home at present.   (25 Apr 2023 18:10)    REVIEW OF SYSTEMS: see cc/HPI   CONSTITUTIONAL: No weakness, fevers or chills  EYES/ENT: No visual changes;  No vertigo or throat pain   NECK: No pain or stiffness  RESPIRATORY: No cough, wheezing, hemoptysis; No shortness of breath  CARDIOVASCULAR: No chest pain or palpitations  GASTROINTESTINAL: No abdominal or epigastric pain. No nausea, vomiting, or hematemesis; No diarrhea or constipation. No melena or hematochezia.  GENITOURINARY: No dysuria, frequency or hematuria  NEUROLOGICAL: No numbness or weakness  SKIN: No itching, rashes    Physical Exam:  General: WN/WD NAD  Neurology: A&Ox3, nonfocal, follows commands  Eyes: PERRLA/ EOMI  ENT/Neck: Neck supple, trachea midline, No JVD  Respiratory: CTA B/L, No wheezing, rales, rhonchi  CV: Normal rate regular rhythm, S1S2, no murmurs, rubs or gallops  Abdominal: Soft, NT, ND +BS,   Extremities: No edema, + peripheral pulses  Skin: No Rashes, Hematoma, Ecchymosis  MSK: diff w/ straight leg lifting / more painful on the L side. Tender in the L lower back/hip    A/p  Inability to ambulate 2/2 pain - lower back and LLE   L sided sciatic type pain   Mechanical fall   S/p L THR done for severe OA ("bone on bone")  -MRI of the L-spine, if abnormal N/S eval    -PT/Rehab eval   -PRN pain Rx   -Muscle relaxed and NSAID PRN     DVT prophylaxis     PATIENT SEEN by ATTENDING 4/25/23 ( note revised 4/26). Yes

## 2023-04-25 NOTE — ED PROVIDER NOTE - CONSIDERATION OF ADMISSION OBSERVATION
Patient reports pain in left hip, no traumatic injuries found, admission was discussed with the patient, he prefers to go home, crutches given, advised to follow-up with his orthopedist Consideration of Admission/Observation

## 2023-04-25 NOTE — ED PROVIDER NOTE - PATIENT PORTAL LINK FT
You can access the FollowMyHealth Patient Portal offered by [facility] by registering at the following website: [email or web address]. By joining [employer]’s FollowMyHealth portal, you will also be able to view your health information using other applications (apps) compatible with our system.

## 2023-04-25 NOTE — H&P ADULT - NSHPLABSRESULTS_GEN_ALL_CORE
LABS:                        14.9   6.92  )-----------( 254      ( 25 Apr 2023 11:50 )             42.5     04-25    139  |  104  |  16  ----------------------------<  94  4.3   |  24  |  1.1    Ca    9.6      25 Apr 2023 11:50    RADIOLOGY:  < from: Xray Chest 1 View- PORTABLE-Urgent (Xray Chest 1 View- PORTABLE-Urgent .) (04.25.23 @ 06:03) >      Impression:    No radiographic evidence of acute cardiopulmonary disease.      < from: Xray Elbow AP + Lateral, Bilateral (04.25.23 @ 06:40) >    Impression:    No acute fracture or acute articular abnormality.        < from: Xray Hip 2-3 Views, Left (04.25.23 @ 06:03) >    Impression:    No acute fracture or acute articular abnormality.

## 2023-04-25 NOTE — ED ADULT TRIAGE NOTE - CHIEF COMPLAINT QUOTE
Pt presenting to ED s/p fall off of ladder approximately 3 feet. Pt c/o R sided hip pain and lower back pain. Pt states he hit the back of his head on the floor

## 2023-04-25 NOTE — ED PROVIDER NOTE - OBJECTIVE STATEMENT
48 yo m hx of left hip replacement, c/o pain to b/l elbows, left hip and head after fall at work off 3 foot step, onto low back, then hit head. no loc. no anticoag.

## 2023-04-25 NOTE — H&P ADULT - HISTORY OF PRESENT ILLNESS
46 yo PMHx of left hip replacement, not on home medicaions c/o pain to b/l elbows, left hip and head after fall at work off 2 foot step onto low back, then hit head. no loc. HD stable, afebrile on arrival to ED with negative trauma workup. Patient was originally on track to be discharged home but found unable to ambulate in ED on attempted  walk with crutches, is able to negotiate only a few steps, Lives alone,  has stairs at home, still complains of a lot of pain, will admit for inability to ambulate and unsafe discharge home at present.

## 2023-04-25 NOTE — H&P ADULT - NSHPPHYSICALEXAM_GEN_ALL_CORE
CONSTITUTIONAL: Well-developed; well-nourished; in no acute distress.  SKIN: Skin exam is warm and dry, no acute rash.  HEAD: Normocephalic; atraumatic.  EYES: PERRL, EOM intact; conjunctiva and sclera clear.  ENT: No nasal discharge; airway clear.   NECK: Supple; non tender.  CARD:+ S1, S2   RESP: No wheezes, rales or rhonchi.  ABD: Normal bowel sounds; soft; non-distended; non-tender;  EXT: Normal ROM. No cyanosis or edema. tender b/l elbows and left hip.  LYMPH: No acute adenopathy.  NEURO: Alert. Grossly unremarkable. No focal deficits.  PSYCH: Cooperative, appropriate.

## 2023-04-25 NOTE — ED PROVIDER NOTE - CLINICAL SUMMARY MEDICAL DECISION MAKING FREE TEXT BOX
47-year-old male with left hip pain status post fall off a 3 foot platform while at work.  No LOC.  Patient reported improvement in pain after administration of pain medications in ED.  Imaging was ordered and reviewed, no acute pathology was found.  Results of all test were discussed with the patient, he reported pain with ambulation, hospital admission was offered, however, he prefers to go home, crutches were provided, he was advised to follow-up with his orthopedist this week as needed.  Strict return precautions given. 47-year-old male with left hip pain status post fall off a 3 foot platform while at work.  No LOC.  Patient reported improvement in pain after administration of pain medications in ED.  Imaging was ordered and reviewed, no acute pathology was found.  Results of all test were discussed with the patient, he reported pain with ambulation, hospital admission was offered, Patient initially refused, however unable to ambulate with crutches, lives alone, has stairs.  Will admit for pain management and inability to ambulate.

## 2023-04-25 NOTE — PATIENT PROFILE ADULT - FALL HARM RISK - HARM RISK INTERVENTIONS

## 2023-04-25 NOTE — ED PROVIDER NOTE - PROGRESS NOTE DETAILS
EP: Patient endorsed to me by Dr. Robert to follow-up with CT scan and dispo.  Patient appears well, reports improvement in pain after administration of pain medications in ED.  Plain films appear negative for acute osseous abnormalities, CT scan done, reading is pending.  Patient is ranging all of his extremities, reports pain in the region of the prosthetic hip on the left.  He is awake and alert, no focal neurodeficits. EP: All imaging reviewed, no acute traumatic pathology found.  This was discussed with the patient, he was given crutches to help with ambulation, advised to follow-up with his orthopedist, Dr. Kendrick, as needed.  Dosing and frequency of OTC pain medications and need for ice packs was discussed with the patient.  All his questions concerns answered, he stable for discharge home. Patient to be discharged from ED. Any available test results were discussed with patient and/or family. Verbal instructions given, including instructions to return to ED immediately for any new, worsening, or concerning symptoms. Patient endorsed understanding. Written discharge instructions additionally given, including follow-up plan.  Patient was given opportunity to ask questions. EP: Patient attempted to walk with crutches, is able to negotiate only a few steps, lives alone,  has stairs at home, still complains of a lot of pain, will admit for inability to ambulate and unsafe discharge home at present. ED: Medical admitting resident requested CBC and chemistry, test were ordered, medicine team will follow the results.

## 2023-04-26 ENCOUNTER — TRANSCRIPTION ENCOUNTER (OUTPATIENT)
Age: 48
End: 2023-04-26

## 2023-04-26 LAB
ALBUMIN SERPL ELPH-MCNC: 4.2 G/DL — SIGNIFICANT CHANGE UP (ref 3.5–5.2)
ALP SERPL-CCNC: 51 U/L — SIGNIFICANT CHANGE UP (ref 30–115)
ALT FLD-CCNC: 27 U/L — SIGNIFICANT CHANGE UP (ref 0–41)
ANION GAP SERPL CALC-SCNC: 7 MMOL/L — SIGNIFICANT CHANGE UP (ref 7–14)
AST SERPL-CCNC: 24 U/L — SIGNIFICANT CHANGE UP (ref 0–41)
BASOPHILS # BLD AUTO: 0.04 K/UL — SIGNIFICANT CHANGE UP (ref 0–0.2)
BASOPHILS NFR BLD AUTO: 0.8 % — SIGNIFICANT CHANGE UP (ref 0–1)
BILIRUB SERPL-MCNC: 1 MG/DL — SIGNIFICANT CHANGE UP (ref 0.2–1.2)
BUN SERPL-MCNC: 16 MG/DL — SIGNIFICANT CHANGE UP (ref 10–20)
CALCIUM SERPL-MCNC: 9.1 MG/DL — SIGNIFICANT CHANGE UP (ref 8.4–10.5)
CHLORIDE SERPL-SCNC: 103 MMOL/L — SIGNIFICANT CHANGE UP (ref 98–110)
CO2 SERPL-SCNC: 27 MMOL/L — SIGNIFICANT CHANGE UP (ref 17–32)
CREAT SERPL-MCNC: 1.1 MG/DL — SIGNIFICANT CHANGE UP (ref 0.7–1.5)
EGFR: 83 ML/MIN/1.73M2 — SIGNIFICANT CHANGE UP
EOSINOPHIL # BLD AUTO: 0.25 K/UL — SIGNIFICANT CHANGE UP (ref 0–0.7)
EOSINOPHIL NFR BLD AUTO: 5 % — SIGNIFICANT CHANGE UP (ref 0–8)
GLUCOSE SERPL-MCNC: 94 MG/DL — SIGNIFICANT CHANGE UP (ref 70–99)
HCT VFR BLD CALC: 44.1 % — SIGNIFICANT CHANGE UP (ref 42–52)
HGB BLD-MCNC: 15 G/DL — SIGNIFICANT CHANGE UP (ref 14–18)
IMM GRANULOCYTES NFR BLD AUTO: 0.2 % — SIGNIFICANT CHANGE UP (ref 0.1–0.3)
LYMPHOCYTES # BLD AUTO: 1.65 K/UL — SIGNIFICANT CHANGE UP (ref 1.2–3.4)
LYMPHOCYTES # BLD AUTO: 33 % — SIGNIFICANT CHANGE UP (ref 20.5–51.1)
MAGNESIUM SERPL-MCNC: 2 MG/DL — SIGNIFICANT CHANGE UP (ref 1.8–2.4)
MCHC RBC-ENTMCNC: 29.8 PG — SIGNIFICANT CHANGE UP (ref 27–31)
MCHC RBC-ENTMCNC: 34 G/DL — SIGNIFICANT CHANGE UP (ref 32–37)
MCV RBC AUTO: 87.5 FL — SIGNIFICANT CHANGE UP (ref 80–94)
MONOCYTES # BLD AUTO: 0.48 K/UL — SIGNIFICANT CHANGE UP (ref 0.1–0.6)
MONOCYTES NFR BLD AUTO: 9.6 % — HIGH (ref 1.7–9.3)
NEUTROPHILS # BLD AUTO: 2.57 K/UL — SIGNIFICANT CHANGE UP (ref 1.4–6.5)
NEUTROPHILS NFR BLD AUTO: 51.4 % — SIGNIFICANT CHANGE UP (ref 42.2–75.2)
NRBC # BLD: 0 /100 WBCS — SIGNIFICANT CHANGE UP (ref 0–0)
PLATELET # BLD AUTO: 240 K/UL — SIGNIFICANT CHANGE UP (ref 130–400)
PMV BLD: 9.4 FL — SIGNIFICANT CHANGE UP (ref 7.4–10.4)
POTASSIUM SERPL-MCNC: 4.5 MMOL/L — SIGNIFICANT CHANGE UP (ref 3.5–5)
POTASSIUM SERPL-SCNC: 4.5 MMOL/L — SIGNIFICANT CHANGE UP (ref 3.5–5)
PROT SERPL-MCNC: 6.5 G/DL — SIGNIFICANT CHANGE UP (ref 6–8)
RBC # BLD: 5.04 M/UL — SIGNIFICANT CHANGE UP (ref 4.7–6.1)
RBC # FLD: 11.9 % — SIGNIFICANT CHANGE UP (ref 11.5–14.5)
SODIUM SERPL-SCNC: 137 MMOL/L — SIGNIFICANT CHANGE UP (ref 135–146)
WBC # BLD: 5 K/UL — SIGNIFICANT CHANGE UP (ref 4.8–10.8)
WBC # FLD AUTO: 5 K/UL — SIGNIFICANT CHANGE UP (ref 4.8–10.8)

## 2023-04-26 PROCEDURE — 99232 SBSQ HOSP IP/OBS MODERATE 35: CPT

## 2023-04-26 PROCEDURE — 73552 X-RAY EXAM OF FEMUR 2/>: CPT | Mod: 26,LT

## 2023-04-26 RX ORDER — METHOCARBAMOL 500 MG/1
750 TABLET, FILM COATED ORAL EVERY 8 HOURS
Refills: 0 | Status: DISCONTINUED | OUTPATIENT
Start: 2023-04-26 | End: 2023-04-27

## 2023-04-26 RX ORDER — ACETAMINOPHEN 500 MG
650 TABLET ORAL EVERY 6 HOURS
Refills: 0 | Status: DISCONTINUED | OUTPATIENT
Start: 2023-04-26 | End: 2023-04-27

## 2023-04-26 RX ORDER — IBUPROFEN 200 MG
600 TABLET ORAL EVERY 8 HOURS
Refills: 0 | Status: DISCONTINUED | OUTPATIENT
Start: 2023-04-26 | End: 2023-04-27

## 2023-04-26 RX ADMIN — Medication 600 MILLIGRAM(S): at 21:39

## 2023-04-26 RX ADMIN — METHOCARBAMOL 750 MILLIGRAM(S): 500 TABLET, FILM COATED ORAL at 05:05

## 2023-04-26 RX ADMIN — METHOCARBAMOL 750 MILLIGRAM(S): 500 TABLET, FILM COATED ORAL at 13:30

## 2023-04-26 RX ADMIN — METHOCARBAMOL 750 MILLIGRAM(S): 500 TABLET, FILM COATED ORAL at 21:38

## 2023-04-26 RX ADMIN — Medication 600 MILLIGRAM(S): at 05:04

## 2023-04-26 RX ADMIN — Medication 600 MILLIGRAM(S): at 13:32

## 2023-04-26 RX ADMIN — Medication 600 MILLIGRAM(S): at 22:09

## 2023-04-26 RX ADMIN — ENOXAPARIN SODIUM 40 MILLIGRAM(S): 100 INJECTION SUBCUTANEOUS at 05:04

## 2023-04-26 NOTE — OCCUPATIONAL THERAPY INITIAL EVALUATION ADULT - RANGE OF MOTION EXAMINATION
(0) swallows foods and liquids w/o difficulty BUE/no Active ROM deficits were identified/no Passive ROM deficits were identified

## 2023-04-26 NOTE — CONSULT NOTE ADULT - NS ATTEND AMEND GEN_ALL_CORE FT
Orthopedic Trauma Attending  Patient seen and examined.  PMHx, Psurg Hx, Medications and Allergies reviewed.  X-rays and CT reviewed.  Agree with findings, assessment and plan.  impression: s/p fall from ht less than 6 ft on to lateral left hip.  Radiographs unremarkable.  CT possible GT fx? versus muscle contusion.  MRI will be significantly degraded by retained prosthesis.  Would continue to mobilize WBAT.  Will follow.

## 2023-04-26 NOTE — DISCHARGE NOTE PROVIDER - CARE PROVIDER_API CALL
Jerod Franco)  Orthopaedic Surgery  [address] [place] [address]  Phone: [phone]  Fax: [phone]  Follow Up Time: 1 week    BRITTNEY CONNOLLY  Internal Medicine  [address] [place] [address]  Phone: [phone]  Fax: [phone]  Follow Up Time: 1 week

## 2023-04-26 NOTE — OCCUPATIONAL THERAPY INITIAL EVALUATION ADULT - LIVES WITH, PROFILE
in a private home +8-10 steps to enter with wide bilateral handrails +walk-in-shower +standard toilet/alone

## 2023-04-26 NOTE — DISCHARGE NOTE PROVIDER - NSDCFUADDAPPT_GEN_ALL_CORE_FT
We have provided info for orthopedic surgeon Dr. Sunny Franco. You can also follow up with your outpatient surgeon Dr. Kendrick

## 2023-04-26 NOTE — OCCUPATIONAL THERAPY INITIAL EVALUATION ADULT - PERTINENT HX OF CURRENT PROBLEM, REHAB EVAL
46 yo PMHx of left hip replacement, not on home medications c/o pain to b/l elbows, left hip and head after fall at work off 2 foot step onto low back, then hit head. No loc. HD stable, afebrile on arrival to ED with negative trauma workup. Patient was originally on track to be discharged home but found unable to ambulate in ED on attempted  walk with crutches, is able to negotiate only a few steps. Lives alone, has stairs at home, still complains of a lot of pain, will admit for inability to ambulate and unsafe discharge home at present.     CT head 4/25: No acute intracranial pathology.  CT pelvis 4/25: No evidence for acute fracture or dislocation.  Xray femur 4/26: Post left total hip arthroplasty. Alignment is preserved. No radiographic evidence for hardware complication. No acute fracture or dislocation. Chronic appearing osseous fragment adjacent to superior acetabulum. Quadriceps enthesophyte.

## 2023-04-26 NOTE — DISCHARGE NOTE PROVIDER - NSDCCPCAREPLAN_GEN_ALL_CORE_FT
PRINCIPAL DISCHARGE DIAGNOSIS  Diagnosis: Fall  Assessment and Plan of Treatment: You were admitted for a mechanical fall and management of your hip pain. Imaging was done during your stay which revealed no significant changes. You were seen by the orhtopedic team and they recommend you follow up with your outpatient orthopedic surgeon Dr. Kendrick.   Please continue with your PT/OT rehab at home and if you have worsening pain or inability to ambulate please come to the ED.      SECONDARY DISCHARGE DIAGNOSES  Diagnosis: Left hip pain  Assessment and Plan of Treatment:     Diagnosis: Contusion  Assessment and Plan of Treatment:     Diagnosis: Ambulatory dysfunction  Assessment and Plan of Treatment:

## 2023-04-26 NOTE — CONSULT NOTE ADULT - CONSULT REASON
Dr Harjinder Rodriguez believes pt will be ready for possible d/c today. Message sent to [facility] at [facility] and she needs updated PT note. Whiteboard message to therapy. fall

## 2023-04-26 NOTE — CONSULT NOTE ADULT - SUBJECTIVE AND OBJECTIVE BOX
Orthopaedic Surgery Consult Note    For Surgeon:     HPI:  47yMale  HPI:  48 yo PMHx of left hip replacement by  in June 2020, not on home medicaions c/o pain to left hip  fall at work (patient is an employee of [employer]) off 2 foot steping stool onto low back and left hip and head 2 days ago. no loc. HD stable, afebrile on arrival to ED with negative trauma workup. Xrays negative in ER, patient admitted for inability to ambulate due to isolated left hip pain. Patient states that since the fall he feels as if his pain has gotten better. he describes it as isolated to the lateral hip and denies any pain at rest or laying in bed. pain has improved with hip flexion and extension, he was unable to flex or extend knee yesterday due to thigh pain but that is resolved and he is able to today. he attempted walking yesterday with crutches which was still painful and hard to do. he denies any urinary symptoms, any numbness or tingling in the leg.     Allergies  No Known Allergies  Intolerances      PAST MEDICAL & SURGICAL HISTORY:  No pertinent past medical history  History of hip replacement  left hip 06/2020 [facility]        MEDICATIONS  (STANDING):  enoxaparin Injectable 40 milliGRAM(s) SubCutaneous every 24 hours  morphine  - Injectable 4 milliGRAM(s) IV Push at bedtime    MEDICATIONS  (PRN):  acetaminophen     Tablet .. 650 milliGRAM(s) Oral every 6 hours PRN Mild Pain (1 - 3)  ibuprofen  Tablet. 600 milliGRAM(s) Oral every 8 hours PRN Moderate Pain (4 - 6)  methocarbamol 750 milliGRAM(s) Oral every 8 hours PRN Muscle Spasm  morphine  - Injectable 4 milliGRAM(s) IV Push once PRN Severe Pain (7 - 10)      Vital Signs Last 24 Hrs  T(C): 36.2 (26 Apr 2023 05:00), Max: 36.4 (25 Apr 2023 15:46)  T(F): 97.1 (26 Apr 2023 05:00), Max: 97.5 (25 Apr 2023 15:46)  HR: 58 (26 Apr 2023 05:00) (56 - 70)  BP: 110/66 (26 Apr 2023 05:00) (110/66 - 140/73)  BP(mean): --  RR: --  SpO2: 100% (26 Apr 2023 05:00) (97% - 100%)    Parameters below as of 26 Apr 2023 05:00  Patient On (Oxygen Delivery Method): room air        Physical Exam:  Patient laying in bed in NAD    LLE:  left hip skin intact with healed JUMANA scar, no bruising, mild swelling to lateral thigh, +ttp at greater trochanter  pt is able to straight leg raise against gravity  FROM knee and ankle   no pain with axial compression or log rolling   SILT sp/dp/t/sural/saph  firing quads/hamstrings/ta/ehl/fhl/gs                        15.0   5.00  )-----------( 240      ( 26 Apr 2023 07:58 )             44.1     04-26    137  |  103  |  16  ----------------------------<  94  4.5   |  27  |  1.1    Ca    9.1      26 Apr 2023 07:58  Mg     2.0     04-26    TPro  6.5  /  Alb  4.2  /  TBili  1.0  /  DBili  x   /  AST  24  /  ALT  27  /  AlkPhos  51  04-26      Imaging: < from: CT Pelvis No Cont (04.25.23 @ 06:07) >  BONES AND SOFT TISSUES: Patient is post left hip arthroplasty. No   periprosthetic lucency or fracture is identified. No acutepelvic   fractures or dislocations identified. No soft tissue hematoma.      A/P: 47yMale with left hip pain s/p fall at work   -WBAT LLE  -no acute fractures or dislocations of the hip are seen on imaging   -PT- ambulate with walker and crutches   -NSAIDs for pain and inflammation; d/c morphine   -if patients pain improving and ambulating improving he can be d/c from ortho standpoint; if unable to ambulate MRI inpatient   -f/u with  outpatient- patients primary orthopedic surgeon

## 2023-04-26 NOTE — DISCHARGE NOTE PROVIDER - CARE PROVIDERS DIRECT ADDRESSES
[email or web address]

## 2023-04-26 NOTE — PROGRESS NOTE ADULT - ASSESSMENT
46 yo PMHx of left hip replacement, not on home medicaions c/o pain to b/l elbows, left hip and head after fall at work off 2 foot step onto low back, then hit head. no loc. HD stable, afebrile on arrival to ED with negative trauma workup. Patient was originally on track to be discharged home but found unable to ambulate in ED on attempted  walk with crutches, is able to negotiate only a few steps, Lives alone,  has stairs at home, still complains of a lot of pain, will admit for inability to ambulate and unsafe discharge home at present.    #Inability to ambulate secondary to left hip intractable pain,  #mechanical fall  #Hx L- hip replacement  - CT head/ pelvis on admission without acute findings, CXR unremarkable,    b.l  X-ray elbow without trauma  - c/w morphine PO PRN sever pain  - PT, OT in am  - ambulate with assistance only  - Ortho recs noted. >> WBAT.    #Diet: Regular  #DVT Prophylaxis: Lovenox SubQ  #Code Satus: full code    #Pending : PT/OT, Pain control    Dispo: STR vs Home

## 2023-04-26 NOTE — PROGRESS NOTE ADULT - ASSESSMENT
46 yo PMHx of left hip replacement, not on home medicaions c/o pain to b/l elbows, left hip and head after fall at work off 2 foot step onto low back, then hit head. no loc. HD stable, afebrile on arrival to ED with negative trauma workup. Patient was originally on track to be discharged home but found unable to ambulate in ED on attempted  walk with crutches, is able to negotiate only a few steps, Lives alone,  has stairs at home, still complains of a lot of pain, will admit for inability to ambulate and unsafe discharge home at present.    #Inability to ambulate secondary to intractable pain,  #mechanical fall  #Hx L- hip replacement  - CT head/ pelvis on admission without acute fx, CXR unremarkable,    b.l  X-ray elbow without trauma  PLAN:  - c/w morphine PO PRN sever pain  - PT, OT in am  - ambulate with assistance only  - consider MRI pelvis in am if pain unimproving    #Diet: Regular  #DVT Prophylaxis: Lovenox SubQ  #Code Satus: full code 46 yo PMHx of left hip replacement, not on home medicaions c/o pain to b/l elbows, left hip and head after fall at work off 2 foot step onto low back, then hit head. no loc. HD stable, afebrile on arrival to ED with negative trauma workup. Patient was originally on track to be discharged home but found unable to ambulate in ED on attempted  walk with crutches, is able to negotiate only a few steps, Lives alone,  has stairs at home, still complains of a lot of pain, will admit for inability to ambulate and unsafe discharge home at present.    #Inability to ambulate secondary to left hip intractable pain,  #mechanical fall  #Hx L- hip replacement  - CT head/ pelvis on admission without acute findings, CXR unremarkable,    b.l  X-ray elbow without trauma  - c/w morphine PO PRN sever pain  - PT, OT in am  - ambulate with assistance only  - Ortho contacted; pending recs  - consider MRI pelvis in am if pain unimproving    #Diet: Regular  #DVT Prophylaxis: Lovenox SubQ  #Code Satus: full code  #Dispo: pending Ortho recs and pain improvement

## 2023-04-26 NOTE — DISCHARGE NOTE PROVIDER - PROVIDER TOKENS
PROVIDER:[TOKEN:[[id number]],FOLLOWUP:[1 week]],PROVIDER:[TOKEN:[[id number]],FOLLOWUP:[1 week]]

## 2023-04-26 NOTE — DISCHARGE NOTE PROVIDER - NSDCMRMEDTOKEN_GEN_ALL_CORE_FT
Aspir 81:   CeleBREX:   cephalexin 500 mg oral tablet: 1 tab(s) orally 4 times a day   gabapentin:    ibuprofen 600 mg oral tablet: 1 tab(s) orally every 8 hours as needed for Moderate Pain (4 - 6) take one tablet every 8 hours PRN for pain  methocarbamol 750 mg oral tablet: 1 tab(s) orally every 8 hours as needed for Muscle Spasm Take one tablet every 8 hours as needed for muscle pain/spasms

## 2023-04-26 NOTE — PHYSICAL THERAPY INITIAL EVALUATION ADULT - GENERAL OBSERVATIONS, REHAB EVAL
1:45-2:15 pt encountered in bed in [facility]. He was able to safely ambulate and negotiated 12 steps with supervision.  Pt would benefit from outpatient PT.

## 2023-04-26 NOTE — DISCHARGE NOTE PROVIDER - HOSPITAL COURSE
HPI:  48 yo PMHx of left hip replacement, not on home medicaions c/o pain to b/l elbows, left hip and head after fall at work off 2 foot step onto low back, then hit head. no loc. HD stable, afebrile on arrival to ED with negative trauma workup. Patient was originally on track to be discharged home but found unable to ambulate in ED on attempted  walk with crutches, is able to negotiate only a few steps, Lives alone,  has stairs at home, still complains of a lot of pain, will admit for inability to ambulate and unsafe discharge home at present.   (25 Apr 2023 18:10)    Active Issues during stay      #Inability to ambulate secondary to left hip intractable pain,  #mechanical fall  #Hx L- hip replacement  - CT head/ pelvis on admission without acute findings, CXR unremarkable,    b.l  X-ray elbow without trauma  - c/w morphine PO PRN sever pain  - PT, OT evaluated  - ambulate with assistance only  - Ortho contacted; WBAT and no need for MRI now --> OP follow up  - consider MRI pelvis in am if pain unimproving   HPI:  46 yo PMHx of left hip replacement, not on home medicaions c/o pain to b/l elbows, left hip and head after fall at work off 2 foot step onto low back, then hit head. no loc. HD stable, afebrile on arrival to ED with negative trauma workup. Patient was originally on track to be discharged home but found unable to ambulate in ED on attempted  walk with crutches, is able to negotiate only a few steps, Lives alone,  has stairs at home, still complains of a lot of pain, will admit for inability to ambulate and unsafe discharge home at present.   (25 Apr 2023 18:10)    Active Issues during stay      #Inability to ambulate secondary to left hip intractable pain,  #mechanical fall  #Hx L- hip replacement  - CT head/ pelvis on admission without acute findings, CXR unremarkable,    b.l  X-ray elbow without trauma  - pt does not want opioids - c/w ibuprofen 600mg q8hr PRN, methocarbamol 750mg q8hr PRN   - s/p PT/OT - op PT/OT acceptable   - ortho following - will f/u w/ Dr. Kendrick op   - ambulate with walker/crutches   - can be d/c home with op follow up

## 2023-04-26 NOTE — OCCUPATIONAL THERAPY INITIAL EVALUATION ADULT - GENERAL OBSERVATIONS, REHAB EVAL
13:50-14:13; chart reviewed, ok to treat by Occupational Therapist as confirmed by BENNIE Bloom, Pt received semifowler +heplock in NAD. Pt reported 5/10 left gluteal pain; BENNIE Bloom aware. Pt in agreement with OT IE.

## 2023-04-27 ENCOUNTER — TRANSCRIPTION ENCOUNTER (OUTPATIENT)
Age: 48
End: 2023-04-27

## 2023-04-27 VITALS
HEART RATE: 63 BPM | DIASTOLIC BLOOD PRESSURE: 77 MMHG | RESPIRATION RATE: 18 BRPM | SYSTOLIC BLOOD PRESSURE: 141 MMHG | OXYGEN SATURATION: 97 % | TEMPERATURE: 97 F

## 2023-04-27 LAB
ALBUMIN SERPL ELPH-MCNC: 4.5 G/DL — SIGNIFICANT CHANGE UP (ref 3.5–5.2)
ALP SERPL-CCNC: 52 U/L — SIGNIFICANT CHANGE UP (ref 30–115)
ALT FLD-CCNC: 30 U/L — SIGNIFICANT CHANGE UP (ref 0–41)
ANION GAP SERPL CALC-SCNC: 13 MMOL/L — SIGNIFICANT CHANGE UP (ref 7–14)
APTT BLD: 32.8 SEC — SIGNIFICANT CHANGE UP (ref 27–39.2)
AST SERPL-CCNC: 23 U/L — SIGNIFICANT CHANGE UP (ref 0–41)
BILIRUB SERPL-MCNC: 1.2 MG/DL — SIGNIFICANT CHANGE UP (ref 0.2–1.2)
BLD GP AB SCN SERPL QL: SIGNIFICANT CHANGE UP
BUN SERPL-MCNC: 16 MG/DL — SIGNIFICANT CHANGE UP (ref 10–20)
CALCIUM SERPL-MCNC: 9.5 MG/DL — SIGNIFICANT CHANGE UP (ref 8.4–10.5)
CHLORIDE SERPL-SCNC: 104 MMOL/L — SIGNIFICANT CHANGE UP (ref 98–110)
CO2 SERPL-SCNC: 24 MMOL/L — SIGNIFICANT CHANGE UP (ref 17–32)
CREAT SERPL-MCNC: 1.1 MG/DL — SIGNIFICANT CHANGE UP (ref 0.7–1.5)
EGFR: 83 ML/MIN/1.73M2 — SIGNIFICANT CHANGE UP
GLUCOSE SERPL-MCNC: 81 MG/DL — SIGNIFICANT CHANGE UP (ref 70–99)
HCT VFR BLD CALC: 47 % — SIGNIFICANT CHANGE UP (ref 42–52)
HGB BLD-MCNC: 16.1 G/DL — SIGNIFICANT CHANGE UP (ref 14–18)
INR BLD: 1.05 RATIO — SIGNIFICANT CHANGE UP (ref 0.65–1.3)
MAGNESIUM SERPL-MCNC: 2.1 MG/DL — SIGNIFICANT CHANGE UP (ref 1.8–2.4)
MCHC RBC-ENTMCNC: 29.8 PG — SIGNIFICANT CHANGE UP (ref 27–31)
MCHC RBC-ENTMCNC: 34.3 G/DL — SIGNIFICANT CHANGE UP (ref 32–37)
MCV RBC AUTO: 86.9 FL — SIGNIFICANT CHANGE UP (ref 80–94)
NRBC # BLD: 0 /100 WBCS — SIGNIFICANT CHANGE UP (ref 0–0)
PLATELET # BLD AUTO: 248 K/UL — SIGNIFICANT CHANGE UP (ref 130–400)
PMV BLD: 9.6 FL — SIGNIFICANT CHANGE UP (ref 7.4–10.4)
POTASSIUM SERPL-MCNC: 4.2 MMOL/L — SIGNIFICANT CHANGE UP (ref 3.5–5)
POTASSIUM SERPL-SCNC: 4.2 MMOL/L — SIGNIFICANT CHANGE UP (ref 3.5–5)
PROT SERPL-MCNC: 7.2 G/DL — SIGNIFICANT CHANGE UP (ref 6–8)
PROTHROM AB SERPL-ACNC: 12 SEC — SIGNIFICANT CHANGE UP (ref 9.95–12.87)
RBC # BLD: 5.41 M/UL — SIGNIFICANT CHANGE UP (ref 4.7–6.1)
RBC # FLD: 11.8 % — SIGNIFICANT CHANGE UP (ref 11.5–14.5)
SODIUM SERPL-SCNC: 141 MMOL/L — SIGNIFICANT CHANGE UP (ref 135–146)
WBC # BLD: 4.98 K/UL — SIGNIFICANT CHANGE UP (ref 4.8–10.8)
WBC # FLD AUTO: 4.98 K/UL — SIGNIFICANT CHANGE UP (ref 4.8–10.8)

## 2023-04-27 PROCEDURE — 99232 SBSQ HOSP IP/OBS MODERATE 35: CPT

## 2023-04-27 RX ORDER — METHOCARBAMOL 500 MG/1
1 TABLET, FILM COATED ORAL
Qty: 42 | Refills: 0
Start: 2023-04-27 | End: 2023-05-10

## 2023-04-27 RX ORDER — ASPIRIN/CALCIUM CARB/MAGNESIUM 324 MG
0 TABLET ORAL
Qty: 0 | Refills: 0 | DISCHARGE

## 2023-04-27 RX ORDER — CELECOXIB 200 MG/1
0 CAPSULE ORAL
Qty: 0 | Refills: 0 | DISCHARGE

## 2023-04-27 RX ORDER — GABAPENTIN 400 MG/1
0 CAPSULE ORAL
Qty: 0 | Refills: 0 | DISCHARGE

## 2023-04-27 RX ORDER — IBUPROFEN 200 MG
1 TABLET ORAL
Qty: 42 | Refills: 0
Start: 2023-04-27 | End: 2023-05-10

## 2023-04-27 RX ADMIN — Medication 600 MILLIGRAM(S): at 09:43

## 2023-04-27 RX ADMIN — Medication 600 MILLIGRAM(S): at 10:40

## 2023-04-27 RX ADMIN — METHOCARBAMOL 750 MILLIGRAM(S): 500 TABLET, FILM COATED ORAL at 09:25

## 2023-04-27 NOTE — PROGRESS NOTE ADULT - SUBJECTIVE AND OBJECTIVE BOX
ERNAARTIES, VELMA  47y  Male      Patient is a 47y old  Male who presents with a chief complaint of Mechanical Fall       INTERVAL HPI/OVERNIGHT EVENTS:      ******************************* REVIEW OF SYSTEMS:**********************************************    All other review of systems negative    *********************** VITALS ******************************************    T(F): 97.1 (04-27-23 @ 05:00)  HR: 55 (04-27-23 @ 05:00) (55 - 61)  BP: 123/68 (04-27-23 @ 05:00) (119/71 - 137/73)  RR: 18 (04-27-23 @ 05:00) (18 - 18)  SpO2: 100% (04-27-23 @ 05:00) (98% - 100%)            ******************************** PHYSICAL EXAM:**************************************************  GENERAL: NAD    PSYCH: no agitation, baseline mentation  HEENT:     NERVOUS SYSTEM:  Alert & Oriented X3,  PULMONARY: OLIVIA, CTA    CARDIOVASCULAR: S1S2 RRR    GI: Soft, NT, ND; BS present.    EXTREMITIES:  2+ Peripheral Pulses, No clubbing, cyanosis, or edema    LYMPH: No lymphadenopathy noted    SKIN: No rashes or lesions      **************************** LABS *******************************************************                          16.1   4.98  )-----------( 248      ( 27 Apr 2023 06:08 )             47.0     04-27    141  |  104  |  16  ----------------------------<  81  4.2   |  24  |  1.1    Ca    9.5      27 Apr 2023 06:08  Mg     2.1     04-27    TPro  7.2  /  Alb  4.5  /  TBili  1.2  /  DBili  x   /  AST  23  /  ALT  30  /  AlkPhos  52  04-27        PT/INR - ( 27 Apr 2023 06:08 )   PT: 12.00 sec;   INR: 1.05 ratio         PTT - ( 27 Apr 2023 06:08 )  PTT:32.8 sec  Lactate Trend        CAPILLARY BLOOD GLUCOSE              **************************Active Medications *******************************************  No Known Allergies      acetaminophen     Tablet .. 650 milliGRAM(s) Oral every 6 hours PRN  enoxaparin Injectable 40 milliGRAM(s) SubCutaneous every 24 hours  ibuprofen  Tablet. 600 milliGRAM(s) Oral every 8 hours PRN  methocarbamol 750 milliGRAM(s) Oral every 8 hours PRN      ***************************************************  RADIOLOGY & ADDITIONAL TESTS:    Imaging Personally Reviewed:  [ ] YES  [ ] NO    HEALTH ISSUES - PROBLEM Dx:      
HPI:  48 yo PMHx of left hip replacement, not on home medicaions c/o pain to b/l elbows, left hip and head after fall at work off 2 foot step onto low back, then hit head. no loc. HD stable, afebrile on arrival to ED with negative trauma workup. Patient was originally on track to be discharged home but found unable to ambulate in ED on attempted  walk with crutches, is able to negotiate only a few steps, Lives alone,  has stairs at home, still complains of a lot of pain, will admit for inability to ambulate and unsafe discharge home at present.   (25 Apr 2023 18:10)    Currently admitted to medicine with the primary diagnosis of Fall       Today is hospital day 2d.     INTERVAL HPI / OVERNIGHT EVENTS:  Patient was examined and seen at bedside. This morning he is resting in bed, no events overnight. Worked with PT/OT yesterday, cleared for dc home.     ROS: Otherwise unremarkable     PAST MEDICAL & SURGICAL HISTORY  No pertinent past medical history    History of hip replacement  left hip 06/2020 [facility]      ALLERGIES  No Known Allergies    MEDICATIONS  STANDING MEDICATIONS  enoxaparin Injectable 40 milliGRAM(s) SubCutaneous every 24 hours    PRN MEDICATIONS  acetaminophen     Tablet .. 650 milliGRAM(s) Oral every 6 hours PRN  ibuprofen  Tablet. 600 milliGRAM(s) Oral every 8 hours PRN  methocarbamol 750 milliGRAM(s) Oral every 8 hours PRN    VITALS:  T(F): 97.1  HR: 55  BP: 123/68  RR: 18  SpO2: 100%    PHYSICAL EXAM  * General Appearance: Alert, cooperative, interactive, well-groomed, oriented to time, place, and person, in no acute distress  * Lungs: Respirations unlabored, Good bilateral air entry, normal breath sounds  * Chest Wall: No tenderness or deformity  * Heart: Regular Rate and Rhythm, normal S1 and S2, no audible murmur, rub, or gallop  * Abdomen: Symmetric, soft, non-tender, bowel sounds active all four quadrants  * Extremities: Extremities normal, atraumatic, no cyanosis, no lower extremity pitting edema bilaterally,    LABS                        16.1   4.98  )-----------( 248      ( 27 Apr 2023 06:08 )             47.0     04-27    141  |  104  |  16  ----------------------------<  81  4.2   |  24  |  1.1    Ca    9.5      27 Apr 2023 06:08  Mg     2.1     04-27    TPro  7.2  /  Alb  4.5  /  TBili  1.2  /  DBili  x   /  AST  23  /  ALT  30  /  AlkPhos  52  04-27    PT/INR - ( 27 Apr 2023 06:08 )   PT: 12.00 sec;   INR: 1.05 ratio         PTT - ( 27 Apr 2023 06:08 )  PTT:32.8 sec              
Location: [address] ([facility] 3A)  Patient Name: VELMA MCCLOUD  Age: 47y  Gender: Male    Past Medical and Surgical History:  No pertinent past medical history    History of hip replacement  left hip 06/2020 [facility]        Social History:  Social History:      Allergies:  No Known Allergies      Patient is a 47y old Male who presents with a chief complaint of   Primary diagnosis of Repeated falls        Progress Note  This morning patient was seen and examined at bedside.    Today is hospital day 1d.  Reports having good night sleep. Hip and elbow pain (chief complaint) has improved. Appetite is adequate, and denies nausea or vomiting. Denies any abdominal pain, diarrhea, or constipation.   Report difficulty bearing weight on his left hip and rotating laterally due to pain  Bed bound no shortness of breath, chest pain, palpitations, or light headedness.  Voiding freely    Vital Signs in the last 24 hours   Vitals Summary T(C): 36.2 (04-26-23 @ 05:00), Max: 36.4 (04-25-23 @ 15:46)  HR: 58 (04-26-23 @ 05:00) (56 - 70)  BP: 110/66 (04-26-23 @ 05:00) (110/66 - 140/73)  RR: --  SpO2: 100% (04-26-23 @ 05:00) (97% - 100%)  Vent Data   Intake/ Output       Physical Exam  * General Appearance: Alert, cooperative, interactive, well-groomed, oriented to time, place, and person, in no acute distress  * Lungs: Respirations unlabored, Good bilateral air entry, normal breath sounds  * Chest Wall: No tenderness or deformity  * Heart: Regular Rate and Rhythm, normal S1 and S2, no audible murmur, rub, or gallop  * Abdomen: Symmetric, soft, non-tender, bowel sounds active all four quadrants  * Extremities: Extremities normal, atraumatic, no cyanosis, no lower extremity pitting edema bilaterally,      Investigations   Laboratory Workup  - CBC:                        14.9   6.92  )-----------( 254      ( 25 Apr 2023 11:50 )             42.5     - Chemistry:  04-25    139  |  104  |  16  ----------------------------<  94  4.3   |  24  |  1.1    Ca    9.6      25 Apr 2023 11:50      - Coagulation Studies:    - ABG:    - Cardiac Markers:        Microbiological Workup        Radiological Workup  *  Current Medications  Standing Medications  enoxaparin Injectable 40 milliGRAM(s) SubCutaneous every 24 hours  morphine  - Injectable 4 milliGRAM(s) IV Push at bedtime    PRN Medications  ibuprofen  Tablet. 600 milliGRAM(s) Oral every 8 hours PRN Moderate Pain (4 - 6)  methocarbamol 750 milliGRAM(s) Oral every 8 hours PRN Muscle Spasm  morphine  - Injectable 4 milliGRAM(s) IV Push once PRN Severe Pain (7 - 10)    Singles Doses Administered  (ADM OVERRIDE) 1 each &lt;see task&gt; GiveOnce  (ADM OVERRIDE) 1 each &lt;see task&gt; GiveOnce  (ADM OVERRIDE) 3 each &lt;see task&gt; GiveOnce  (ADM OVERRIDE) 1 each &lt;see task&gt; GiveOnce  ketorolac   Injectable 15 milliGRAM(s) IntraMuscular Once  morphine  - Injectable 4 milliGRAM(s) IV Push once      
  AME VELMA  47y  Male      Patient is a 47y old  Male who presents with a chief complaint of Mechanical Fall      INTERVAL HPI/OVERNIGHT EVENTS:      ******************************* REVIEW OF SYSTEMS:**********************************************    All other review of systems negative    *********************** VITALS ******************************************    T(F): 96.5 (04-26-23 @ 13:00)  HR: 56 (04-26-23 @ 13:00) (56 - 70)  BP: 119/71 (04-26-23 @ 13:00) (110/66 - 140/73)  RR: 18 (04-26-23 @ 13:00) (18 - 18)  SpO2: 98% (04-26-23 @ 13:00) (97% - 100%)            ******************************** PHYSICAL EXAM:**************************************************  GENERAL: NAD    PSYCH: no agitation, baseline mentation  HEENT:     NERVOUS SYSTEM:  Alert & Oriented X3,     PULMONARY: OLIVIA, CTA    CARDIOVASCULAR: S1S2 RRR    GI: Soft, NT, ND; BS present.    EXTREMITIES:  2+ Peripheral Pulses, No clubbing, cyanosis, or edema    LYMPH: No lymphadenopathy noted    SKIN: No rashes or lesions      **************************** LABS *******************************************************                          15.0   5.00  )-----------( 240      ( 26 Apr 2023 07:58 )             44.1     04-26    137  |  103  |  16  ----------------------------<  94  4.5   |  27  |  1.1    Ca    9.1      26 Apr 2023 07:58  Mg     2.0     04-26    TPro  6.5  /  Alb  4.2  /  TBili  1.0  /  DBili  x   /  AST  24  /  ALT  27  /  AlkPhos  51  04-26          Lactate Trend        CAPILLARY BLOOD GLUCOSE              **************************Active Medications *******************************************  No Known Allergies      acetaminophen     Tablet .. 650 milliGRAM(s) Oral every 6 hours PRN  enoxaparin Injectable 40 milliGRAM(s) SubCutaneous every 24 hours  ibuprofen  Tablet. 600 milliGRAM(s) Oral every 8 hours PRN  methocarbamol 750 milliGRAM(s) Oral every 8 hours PRN  morphine  - Injectable 4 milliGRAM(s) IV Push at bedtime  morphine  - Injectable 4 milliGRAM(s) IV Push once PRN      ***************************************************  RADIOLOGY & ADDITIONAL TESTS:    Imaging Personally Reviewed:  [ ] YES  [ ] NO    HEALTH ISSUES - PROBLEM Dx:

## 2023-04-27 NOTE — PROGRESS NOTE ADULT - ASSESSMENT
48 yo PMHx of left hip replacement, not on home medicaions c/o pain to b/l elbows, left hip and head after fall at work off 2 foot step onto low back, then hit head. no loc. HD stable, afebrile on arrival to ED with negative trauma workup. Patient was originally on track to be discharged home but found unable to ambulate in ED on attempted  walk with crutches, is able to negotiate only a few steps, Lives alone,  has stairs at home, still complains of a lot of pain, will admit for inability to ambulate and unsafe discharge home at present.    #Inability to ambulate secondary to left hip intractable pain,  #mechanical fall    pain is under control. Participated with PT   #Hx L- hip replacement  - CT head/ pelvis on admission without acute findings, CXR unremarkable,    b.l  X-ray elbow without trauma  - ambulated today.   - Ortho recs noted. >> WBAT.    #Diet: Regular  #DVT Prophylaxis: Lovenox SubQ  #Code Satus: full code     DC planning.    Dispo: Home with PT

## 2023-04-27 NOTE — DISCHARGE NOTE NURSING/CASE MANAGEMENT/SOCIAL WORK - NSDCPEFALRISK_GEN_ALL_CORE
For information on Fall & Injury Prevention, visit: [email or web address]  [email or web address] OR  https://www.cdc.gov/steadi/patient.html

## 2023-04-27 NOTE — DISCHARGE NOTE NURSING/CASE MANAGEMENT/SOCIAL WORK - PATIENT PORTAL LINK FT
You can access the FollowMyHealth Patient Portal offered by [facility] by registering at the following website: [email or web address]. By joining [employer]’s FollowMyHealth portal, you will also be able to view your health information using other applications (apps) compatible with our system.

## 2023-04-27 NOTE — PROGRESS NOTE ADULT - ASSESSMENT
48 yo PMHx of left hip replacement, not on home medicaions c/o pain to b/l elbows, left hip and head after fall at work off 2 foot step onto low back, then hit head. no loc. HD stable, afebrile on arrival to ED with negative trauma workup. Patient was originally on track to be discharged home but found unable to ambulate in ED on attempted  walk with crutches, is able to negotiate only a few steps, Lives alone,  has stairs at home, still complains of a lot of pain, will admit for inability to ambulate and unsafe discharge home at present.    #Inability to ambulate secondary to left hip intractable pain,  #mechanical fall  #Hx L- hip replacement  - CT head/ pelvis on admission without acute findings, CXR unremarkable,    b.l  X-ray elbow without trauma  - pt does not want opioids - c/w ibuprofen 600mg q8hr PRN, methocarbamol 750mg q8hr PRN   - s/p PT/OT - op PT/OT acceptable   - ortho following - will f/u w/ Dr. Kendrick op   - ambulate with walker/crutches       #Diet: Regular  #DVT Prophylaxis: Lovenox SubQ  #Code Satus: full code  #Dispo: d/c home

## 2023-05-01 DIAGNOSIS — S70.02XA CONTUSION OF LEFT HIP, INITIAL ENCOUNTER: ICD-10-CM
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
## 2023-05-01 DIAGNOSIS — Z79.82 LONG TERM (CURRENT) USE OF ASPIRIN: ICD-10-CM

## 2023-05-01 DIAGNOSIS — Z96.642 PRESENCE OF LEFT ARTIFICIAL HIP JOINT: ICD-10-CM
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
## 2023-05-01 DIAGNOSIS — M54.50 LOW BACK PAIN, UNSPECIFIED: ICD-10-CM
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
## 2023-06-20 NOTE — ED PROVIDER NOTE - MDM ORDERS SUBMITTED SELECTION
Imaging Studies/Medications Olanzapine Counseling- I discussed with the patient the common side effects of olanzapine including but are not limited to: lack of energy, dry mouth, increased appetite, sleepiness, tremor, constipation, dizziness, changes in behavior, or restlessness.  Explained that teenagers are more likely to experience headaches, abdominal pain, pain in the arms or legs, tiredness, and sleepiness.  Serious side effects include but are not limited: increased risk of death in elderly patients who are confused, have memory loss, or dementia-related psychosis; hyperglycemia; increased cholesterol and triglycerides; and weight gain.

## 2023-06-25 ENCOUNTER — EMERGENCY (EMERGENCY)
Facility: HOSPITAL | Age: 48
LOS: 0 days | Discharge: HOME | End: 2023-06-26
Attending: STUDENT IN AN ORGANIZED HEALTH CARE EDUCATION/TRAINING PROGRAM
Payer: COMMERCIAL

## 2023-06-25 VITALS
OXYGEN SATURATION: 100 % | RESPIRATION RATE: 18 BRPM | TEMPERATURE: 99 F | WEIGHT: 184.97 LBS | DIASTOLIC BLOOD PRESSURE: 74 MMHG | SYSTOLIC BLOOD PRESSURE: 133 MMHG | HEIGHT: 70 IN | HEART RATE: 84 BPM

## 2023-06-25 DIAGNOSIS — R11.0 NAUSEA: ICD-10-CM

## 2023-06-25 DIAGNOSIS — Z96.649 PRESENCE OF UNSPECIFIED ARTIFICIAL HIP JOINT: Chronic | ICD-10-CM

## 2023-06-25 DIAGNOSIS — R68.83 CHILLS (WITHOUT FEVER): ICD-10-CM

## 2023-06-25 DIAGNOSIS — Z96.642 PRESENCE OF LEFT ARTIFICIAL HIP JOINT: ICD-10-CM

## 2023-06-25 DIAGNOSIS — F17.210 NICOTINE DEPENDENCE, CIGARETTES, UNCOMPLICATED: ICD-10-CM

## 2023-06-25 DIAGNOSIS — U07.1 COVID-19: ICD-10-CM

## 2023-06-25 PROCEDURE — 0241U: CPT

## 2023-06-25 PROCEDURE — 99284 EMERGENCY DEPT VISIT MOD MDM: CPT

## 2023-06-25 PROCEDURE — 99283 EMERGENCY DEPT VISIT LOW MDM: CPT

## 2023-06-26 ENCOUNTER — TRANSCRIPTION ENCOUNTER (OUTPATIENT)
Age: 48
End: 2023-06-26

## 2023-06-26 LAB
FLUAV AG NPH QL: SIGNIFICANT CHANGE UP
FLUBV AG NPH QL: SIGNIFICANT CHANGE UP
RSV RNA NPH QL NAA+NON-PROBE: SIGNIFICANT CHANGE UP
SARS-COV-2 RNA SPEC QL NAA+PROBE: DETECTED

## 2023-06-26 RX ORDER — IBUPROFEN 200 MG
600 TABLET ORAL ONCE
Refills: 0 | Status: COMPLETED | OUTPATIENT
Start: 2023-06-26 | End: 2023-06-26

## 2023-06-26 RX ORDER — ONDANSETRON 8 MG/1
4 TABLET, FILM COATED ORAL ONCE
Refills: 0 | Status: COMPLETED | OUTPATIENT
Start: 2023-06-26 | End: 2023-06-26

## 2023-06-26 RX ADMIN — Medication 600 MILLIGRAM(S): at 00:59

## 2023-06-26 RX ADMIN — ONDANSETRON 4 MILLIGRAM(S): 8 TABLET, FILM COATED ORAL at 00:59

## 2023-06-26 NOTE — ED PROVIDER NOTE - ATTENDING CONTRIBUTION TO CARE
48 yo m no pmh presents for eval of chills and nausea. sx since 430 pm.  no vomiting, cp, sob, ap, diarrhea. no fevers. no cough/congestion, st, ha    vss  gen- NAD, aaox3  card-rrr  lungs-ctab, no wheezing or rhonchi  abd-sntnd, no guarding or rebound  neuro- full str/sensation, cn ii-xii grossly intact, normal coordination and gait

## 2023-06-26 NOTE — ED PROVIDER NOTE - OBJECTIVE STATEMENT
Patient is a 47-year-old male no past medical history presenting for evaluation of chills and nausea since 4:30 PM this afternoon.  Patient states that he went out to eat with some friends and then came back home around 4:30 PM and started feeling unwell.  Denies chest pain, shortness of breath, abdominal pain, vomiting, diarrhea, constipation.  Patient states he feels warm but does not have a fever, but is sweating and has chills.  No sick contacts. Patient is a 47-year-old male no past medical history presenting for evaluation of chills and nausea since 4:30 PM this afternoon.  Patient states that he went out to eat with some friends and then came back home around 4:30 PM and started feeling unwell. Patient feels nauseous but did not have any episodes of vomiting. Denies chest pain, shortness of breath, abdominal pain, vomiting, diarrhea, constipation.  Patient states he feels warm but does not have a fever, but is sweating and has chills.  No sick contacts.No cough, congestion, sore throat, headache.

## 2023-06-26 NOTE — ED ADULT NURSE NOTE - NS ED NURSE LEVEL OF CONSCIOUSNESS ORIENTATION
Discharge instructions and discharge med list given to pt's wife, voiced understanding.
Oriented - self; Oriented - place; Oriented - time

## 2023-06-26 NOTE — ED PROVIDER NOTE - PATIENT PORTAL LINK FT
You can access the FollowMyHealth Patient Portal offered by [facility] by registering at the following website: [email or web address]. By joining [facility]’s FollowMyHealth portal, you will also be able to view your health information using other applications (apps) compatible with our system.

## 2023-06-26 NOTE — ED PROVIDER NOTE - NSICDXPASTSURGICALHX_GEN_ALL_CORE_FT
PAST SURGICAL HISTORY:  History of hip replacement left hip 06/2020 [facility]

## 2023-06-26 NOTE — ED PROVIDER NOTE - PHYSICAL EXAMINATION
VITAL SIGNS: I have reviewed nursing notes and confirm.  CONSTITUTIONAL: well-appearing, non-toxic, NAD  SKIN: Warm dry, normal skin turgor  HEAD: NCAT  EYES: EOMI, PERRLA, no scleral icterus  ENT: Moist mucous membranes, normal pharynx with no erythema or exudates  NECK: Supple; non tender. Full ROM. (+) cervical LAD  CARD: RRR, no murmurs, rubs or gallops  RESP: clear to ausculation b/l.  No rales, rhonchi, or wheezing.  ABD: soft, + BS, non-tender, non-distended, no rebound or guarding.  EXT: Full ROM, no bony tenderness, no pedal edema, no calf tenderness  NEURO: normal motor. normal sensory.  PSYCH: Cooperative, appropriate.

## 2023-11-13 NOTE — ED ADULT NURSE NOTE - MODE OF DISCHARGE
Phone call to patient for angio teach. Unable to reach, left VM with direct contact for CB.  -sea   Ambulatory

## 2024-06-23 ENCOUNTER — NON-APPOINTMENT (OUTPATIENT)
Age: 49
End: 2024-06-23

## 2024-06-28 NOTE — ED ADULT NURSE NOTE - CHIEF COMPLAINT
Avoid qtips  Return to audiologist for hearing aid exam  Return if symptom recur  
The patient is a 44y Male complaining of syncope.

## 2024-11-27 NOTE — ED PROVIDER NOTE - NSTIMEPROVIDERCAREINITIATE_GEN_ER
Pharmacy: [facility] [place]  Requesting for back spasms. Patient is leaving for [place] on Monday.    tiZANidine (ZANAFLEX) 4 MG tablet [[id number]]    Order Details  Dose: 4 mg Route: Oral Frequency: 3 TIMES DAILY PRN for muscle spasm   Dispense Quantity: 90 tablet Refills: 0          Sig: Take 1 tablet by mouth 3 times daily as needed (muscle spasm)      25-Apr-2023 04:19

## 2025-05-22 NOTE — PHYSICAL THERAPY INITIAL EVALUATION ADULT - DID THE PATIENT HAVE SURGERY?
Render In Strict Bullet Format?: No Detail Level: Zone Continue Regimen: Dupixent 300mg Q 2 weeks\\nTAC 0.1% cream BID AAA Initiate Treatment: mupirocin 2 % topical ointment : AAA to rash areas BID Plan: Can’t use Ximino 90mg 1 PO QD due to liver cirrhosis. Patient has tried and failed Solodyn 135mg, Doxycycline 100mg and 50mg, Augmentin, Acanya, Onexton and Nicaprin. Continue Regimen: Soolantra QHS Plan: See prescription management for atopic dermatitis for now. Consider other biologics in the future if worsening n/a

## 2025-06-10 NOTE — DISCHARGE NOTE PROVIDER - NSDCHC_MEDRECSTATUS_GEN_ALL_CORE
Admission Reconciliation is Completed  Discharge Reconciliation is Not Complete 15 Admission Reconciliation is Completed  Discharge Reconciliation is Completed